# Patient Record
Sex: FEMALE | Race: WHITE | NOT HISPANIC OR LATINO | Employment: STUDENT | URBAN - METROPOLITAN AREA
[De-identification: names, ages, dates, MRNs, and addresses within clinical notes are randomized per-mention and may not be internally consistent; named-entity substitution may affect disease eponyms.]

---

## 2017-03-10 ENCOUNTER — AMBULATORY - HEALTHEAST (OUTPATIENT)
Dept: LAB | Facility: CLINIC | Age: 22
End: 2017-03-10

## 2017-03-10 ENCOUNTER — COMMUNICATION - HEALTHEAST (OUTPATIENT)
Dept: TELEHEALTH | Facility: CLINIC | Age: 22
End: 2017-03-10

## 2017-03-10 ENCOUNTER — COMMUNICATION - HEALTHEAST (OUTPATIENT)
Dept: PEDIATRICS | Facility: CLINIC | Age: 22
End: 2017-03-10

## 2017-03-10 DIAGNOSIS — M34.9 SYSTEMIC SCLEROSIS (H): ICD-10-CM

## 2017-03-10 LAB
ABSOLUTE LYMPHOCYTES (EXTERNAL): 2.3 (ref 0.8–4.4)
ABSOLUTE LYMPHOCYTES (EXTERNAL): 2.3 (ref 0.8–4.4)
ABSOLUTE NEUTROPHILS (EXTERNAL): 2.6 (ref 2–7.7)
ABSOLUTE NEUTROPHILS (EXTERNAL): 2.6 (ref 2–7.7)
ALBUMIN (EXTERNAL): 3.6 (ref 3.5–5)
ALBUMIN (EXTERNAL): 3.6 (ref 3.5–5)
ALT SERPL-CCNC: 13 U/L (ref 0–45)
ALT SERPL-CCNC: 13 U/L (ref 0–45)
AST SERPL-CCNC: 18 U/L (ref 0–40)
AST SERPL-CCNC: 18 U/L (ref 0–40)
BILIRUB SERPL-MCNC: 0.2 MG/DL (ref 0–1)
BILIRUB SERPL-MCNC: 0.2 MG/DL (ref 0–1)
CREAT SERPL-MCNC: 0.63 MG/DL (ref 0.6–1.1)
CREATININE (EXTERNAL): 0.63 (ref 0.5–1.1)
CREATININE (EXTERNAL): 0.63 (ref 0.6–1.1)
CRP INFLAMMATION (EXTERNAL): 1 (ref 0–0.8)
CRP INFLAMMATION (EXTERNAL): 1 (ref 0–0.8)
ERYTHROCYTE [SEDIMENTATION RATE] IN BLOOD: 5 MM/HR (ref 0–20)
ERYTHROCYTE [SEDIMENTATION RATE] IN BLOOD: 5 MM/HR (ref 0–20)
GFR SERPL CREATININE-BSD FRML MDRD: >60 ML/MIN/1.73M2
HEMOGLOBIN: 12.8 G/DL (ref 12–15)
HEMOGLOBIN: 12.8 G/DL (ref 12–16)
PLATELET # BLD AUTO: 518 10^9/L (ref 140–440)
PLATELET # BLD AUTO: 518 10^9/L (ref 140–440)
WBC # BLD AUTO: 5.6 10^9/L (ref 4–11)
WBC # BLD AUTO: 5.6 10^9/L (ref 4–11)

## 2017-06-26 ENCOUNTER — OFFICE VISIT (OUTPATIENT)
Dept: DERMATOLOGY | Facility: CLINIC | Age: 22
End: 2017-06-26
Attending: DERMATOLOGY
Payer: COMMERCIAL

## 2017-06-26 ENCOUNTER — OFFICE VISIT (OUTPATIENT)
Dept: RHEUMATOLOGY | Facility: CLINIC | Age: 22
End: 2017-06-26
Attending: PEDIATRICS
Payer: COMMERCIAL

## 2017-06-26 ENCOUNTER — RECORDS - HEALTHEAST (OUTPATIENT)
Dept: ADMINISTRATIVE | Facility: OTHER | Age: 22
End: 2017-06-26

## 2017-06-26 VITALS
WEIGHT: 139.11 LBS | TEMPERATURE: 98 F | DIASTOLIC BLOOD PRESSURE: 67 MMHG | SYSTOLIC BLOOD PRESSURE: 101 MMHG | BODY MASS INDEX: 24.65 KG/M2 | HEART RATE: 95 BPM | HEIGHT: 63 IN

## 2017-06-26 VITALS
SYSTOLIC BLOOD PRESSURE: 101 MMHG | HEART RATE: 95 BPM | HEIGHT: 63 IN | DIASTOLIC BLOOD PRESSURE: 67 MMHG | WEIGHT: 139.11 LBS | TEMPERATURE: 98 F | BODY MASS INDEX: 24.65 KG/M2

## 2017-06-26 DIAGNOSIS — L94.0 MORPHEA: Primary | ICD-10-CM

## 2017-06-26 DIAGNOSIS — L94.1 LINEAR MORPHEA: Primary | ICD-10-CM

## 2017-06-26 DIAGNOSIS — Z79.60 LONG-TERM USE OF IMMUNOSUPPRESSANT MEDICATION: ICD-10-CM

## 2017-06-26 PROCEDURE — 99211 OFF/OP EST MAY X REQ PHY/QHP: CPT | Mod: ZF

## 2017-06-26 PROCEDURE — 99213 OFFICE O/P EST LOW 20 MIN: CPT | Mod: ZF

## 2017-06-26 ASSESSMENT — PAIN SCALES - GENERAL
PAINLEVEL: NO PAIN (0)
PAINLEVEL: NO PAIN (0)

## 2017-06-26 NOTE — PROGRESS NOTES
Problem list:     Patient Active Problem List    Diagnosis Date Noted     Long-term use of immunosuppressant medication 06/26/2017     Morphea 03/04/2015     Attention deficit hyperactivity disorder (ADHD), predominantly hyperactive type 11/08/2005          Allergies:     No Known Allergies          Medications:     As of completion of this visit:  Current Outpatient Prescriptions   Medication Sig Dispense Refill     methotrexate 2.5 MG tablet CHEMO Take 25 mg orally weekly 130 tablet 3     folic acid (FOLVITE) 1 MG tablet Take 1 tablet (1 mg) by mouth daily 90 tablet 3     mycophenolate (CELLCEPT - GENERIC EQUIVALENT) 500 MG tablet Take 2 tablets (1,000 mg) by mouth 2 times daily 360 tablet 6     tacrolimus (PROTOPIC) 0.03 % ointment Apply topically 2 times daily Apply bid to face and neck as directed  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         Subjective:     rBionna is a 21 year old female who was seen in Pediatric Rheumatology clinic today for follow up.  Brionna was last seen in our clinic on 12/19/2016 and returns today accompanied by her mother.  The primary encounter diagnosis was Morphea. A diagnosis of Long-term use of immunosuppressant medication was also pertinent to this visit.      Brionna has done well.  She is not aware of any substantial progression of her linear scleroderma (morphea).  She wonders if the area of her right neck just below the ear and anterior to the mandible might be somewhat thinner.  She now notices 2 gray hairs on her scalp.  She has not seen loss of hair from the scalp that  "is progressive .  There have been no new spots of involvement on her body.  She has been tolerating her medications well.  She had a cough for about 2 months at school.  Since coming home, the cough has not been an issue, but she has developed some nasal congestion.  Comprehensive Review of Systems is otherwise negative.    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  Move in plans for the fall are coming along (see last time) with painting accomplished this past month.          Examination:     Blood pressure 101/67, pulse 95, temperature 98  F (36.7  C), temperature source Oral, height 5' 2.87\" (159.7 cm), weight 139 lb 1.8 oz (63.1 kg).  Brionna appears generally well and in good spirits.  Head: Normal head and hair.  Eyes: No scleral injection, pupils normal.  Ears: Normal external structures, tympanic membranes.  Nose: No cartilage deformity, congestion.  Mouth: Normal teeth, gums, tongue, mucosa.  Throat: Normal, without erythema or exudate.  Neck: Normal, without thyromegaly  Nodes: No cervical, supraclavicular, axillary, inguinal adenopathy.  Lungs: Normal effort, clear to ausculation.  Heart: Regular rate and rhythm, S1 and S2, no murmurs; normal peripheral pulses and perfusion.  Abdomen: Soft, non-tender, without hepatomegaly, splenomegaly, or masses.  Skin: Forehead change as noted before.  Hairline intact.  No new hair growth evident to me.  Anterior neck, predominantly on the right, shows stable loss of SC tissue, but veins are not substantially different compared to right.  No change in hairline inferior/posterior to ear to suggest backwards extension.  Nails: No pitting, infection.  Neurological: Alert, appropriately interactive, normal cranial nerves, no deficits, normal gait.  Musculoskeletal: No evidence of current synovitis         Last Lab Results:     Abstract on 05/05/2017   Component Date Value     WBC 03/10/2017 5.6      Hemoglobin 03/10/2017 12.8      Platelet Count 03/10/2017 518*     Absolute " Neutrophils (Ex* 03/10/2017 2.6      Absolute Lymphocytes (Ex* 03/10/2017 2.3      Bilirubin Total (Externa* 03/10/2017 0.2      Albumin (External) 03/10/2017 3.6      AST (External) 03/10/2017 18      ALT (External) 03/10/2017 13      Creatinine (External) 03/10/2017 0.63      CRP Inflammation (Extern* 03/10/2017 1.0*     Sed Rate 03/10/2017 5             Assessment:     Morphea managed with topical tacrolimus, oral CellCept and oral methotrexate, seemingly relatively stable.  She is tolerating her medication well.  I suspect her nasal congestion is allergy related, although there is always the possibility of a sinus infection.  She recently had imaging but the results of this are not yet known.          Plan:     1. She will followup today with Dr. Phelps in Dermatology.    2. I recommend she stay on her current therapies unless directed otherwise by Dr. Phelps.  [Discussed with Dr. Phelps and no changes will be made today, but will be discussed this winter.]  3. Labs should be done every 4 months and since they are not sure what they did with the last order set I created another 1 year order set for them that they should file with their lab as well as keep a copy for future reference.  The orders are as listed below.  These should be done every 3-4 months.    4. I suggested follow up with me in 4-6 months in combination with a visit with Dr. Phelps.  Orders Placed This Encounter   Procedures     CBC with platelets differential     Creatinine     Hepatic panel     RBC  Sed Rate     CRP inflammation     IgG      If there are any new questions or concerns, I would be glad to help and can be reached through our main office at [phone] or our paging  at [phone].    Seamus Shaw MD        CC  Patient Care Team:  Aleah Moreno MD as PCP - General (Pediatrics)  Seamus Shaw MD as Referring Physician (Pediatrics)  Remigio Phelps MD as MD (Dermatology)  Schwab, Briana,  RN as Nurse Coordinator  ARSLAN BLOUNT    Copy to patient  YUE HARDING KEVIN  [address] [place] [address]

## 2017-06-26 NOTE — PROGRESS NOTES
[facility] Dermatology Note      Dermatology Problem List:  1. 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 Prominent facial and neck veins    Encounter Date: Jun 26, 2017    CC:   Chief Complaint   Patient presents with     Follow Up For     Morphea       History of Present Illness:  Ms. Brionna Griffin is a 21 year old female who presents as a follow-up for morphea. The patient was last seen 12/19/2016 when her MMF was maintained at 1000mg BID and MTX 25mg weekly (takes 12.5mg Friday and Saturday). We also recommended restarting her protopic ointment BID prn to the morphea plaque on neck which Brionna feels she has not needed. She denies any new skin lesions or concerns today. She is applying sunscreen regularly. She currently has a cough, no recent fevers or other illnesses.    Past Medical History:   Patient Active Problem List   Diagnosis     Morphea     Long-term use of immunosuppressant medication     Past Medical History:   Diagnosis Date     Long-term use of immunosuppressant medication 6/26/2017     History reviewed. No pertinent surgical history.    Medications:  Current Outpatient Prescriptions   Medication Sig Dispense Refill     methotrexate 2.5 MG tablet CHEMO Take 25 mg orally weekly 130 tablet 3     folic acid (FOLVITE) 1 MG tablet Take 1 tablet (1 mg) by mouth daily 90 tablet 3     mycophenolate (CELLCEPT - GENERIC EQUIVALENT) 500 MG tablet Take 2 tablets (1,000 mg) by mouth 2 times daily 360 tablet 6     tacrolimus (PROTOPIC) 0.03 % ointment Apply topically 2 times daily Apply bid to face and neck as directed  Patient has linear scleroderma and has had numerous topical steroids in the past 60 g 1     amphetamine-dextroamphetamine (ADDERALL XR) 30 MG per capsule Take by mouth daily       desogestrel-ethinyl estradiol (APRI) 0.15-30 MG-MCG per tablet Take 1 tablet by mouth daily       Ascorbic Acid  "(VITAMIN C PO) Take 1,000 mg by mouth          No Known Allergies      Review of Systems:  -As per HPI  -Constitutional: The patient denies fatigue, fevers, chills, unintended weight loss, and night sweats.  -HEENT: Patient denies nonhealing oral sores.  -Skin: As above in HPI. No additional skin concerns.    Physical exam:  Vitals: /67  Pulse 95  Temp 98  F (36.7  C) (Oral)  Ht 5' 2.87\" (159.7 cm)  Wt 139 lb 1.8 oz (63.1 kg)  BMI 24.74 kg/m2  GEN: This is a well developed, well-nourished female in no acute distress, in a pleasant mood.    SKIN: Focused examination of the face, neck, scalp, right and left hands, and finger nails was performed.  -Linear atrophic plaque of the medial forehead with sparing of the glabella extending to the anterior medial scalp: From central scalp to tip of nose it [id number], widest at top (2cm in width).    -Mildly atrophic hyperpigmented plaque 10x8cm on the right lateral neck with prominent underlying veins  -No other lesions of concern on areas examined.                     Impression/Plan:  1. Morphea with en waqar fernandes: stable since her last visit   -Continue MMF 1000mg BID and MTX 25mg Qweek, per rheumatology. If pt is stable at the next visit, may consider discontinuation of mycophenolate per Dr. Shaw.   -Encouraged pt to continue to follow sun protective measures to minimize the appearence of the morphea on the neck  -Encouraged follow-up with her orthodontist for a repeat panoramic XR. We discussed with family that there have been case reports of patients with linea morphea also with affected shortened teeth.     CC Dr. Seamus Shaw on close of this encounter.    Follow-up in 6 months, earlier for new or changing lesions.     Dr. Phelps staffed the patient.    Isabel Martin M.D.   PGY-2 Pediatric Resident  [phone]    I have personally examined this patient and agree with the resident's documentation and plan of care.  I have reviewed and amended the " resident's note above.  The documentation accurately reflects my clinical observations, diagnoses, treatment and follow-up plans.     Remigio Phelps MD  , Pediatric Dermatology

## 2017-06-26 NOTE — MR AVS SNAPSHOT
After Visit Summary   6/26/2017    Brionna Griffin    MRN: [id number]           Patient Information     Date Of Birth          1995        Visit Information        Provider Department      6/26/2017 11:00 AM Remigio Phelps MD Peds Dermatology        [facility]- Pediatric Dermatology  Dr. Michelle Sullivan, Dr. Dena Bishop, Dr. Remigio Phelps, Dr. Filomena Gardner, Dr. Derick Wright       Pediatric Appointment Scheduling and Call Center [phone]     Non Urgent -Triage Voicemail Line; [phone]- Regina and Nora RN's. Messages are checked periodically throughout the day and are returned as soon as possible.      Clinic Fax number: [phone]    If you need a prescription refill, please contact your pharmacy. They will send us an electronic request. Refills are approved or denied by our Physicians during normal business hours, Monday through Fridays    Per office policy, refills will not be granted if you have not been seen within the past year (or sooner depending on your child's condition)    *Radiology Scheduling- [phone]  *Sedation Unit Scheduling- [phone]  *[place] Scheduling- General [phone]; Pediatric Dermatology [phone]  *Main  Services: [phone]   Palestinian: [phone]   French: [phone]   Hmong/Bengali/Sinhala: [phone]    For urgent matters that cannot wait until the next business day, is over a holiday and/or a weekend please call [phone] and ask for the Dermatology Resident On-Call to be paged.               Today:   - We recommend you follow-up with your orthodontist to obtain another x-ray  - Continue your current medications - MMF 1000mg twice daily and Methotrexate 25mg daily  - Follow up in derm clinic in 6 months at which time we will discuss coming off of a medication             Follow-ups after your visit        Follow-up notes from your care team      Return in about 6 months (around 2017).      Future tests that were ordered for you today     Open Standing Orders        Priority Remaining Interval Expires Ordered    CBC with platelets differential Routine 4/4 Every 3-4 months 2018    Creatinine Routine 4/4 Every 3-4 months 2018    Hepatic panel Routine 4/4 Every 3-4 months 2018    RBC  Sed Rate Routine 4/4 Every 3-4 months 2018    CRP inflammation Routine 4/4 Every 3-4 months 2018    IgG Routine 4/4 Every 3-4 months 2018            Who to contact     Please call your clinic at [phone] to:    Ask questions about your health    Make or cancel appointments    Discuss your medicines    Learn about your test results    Speak to your doctor   If you have compliments or concerns about an experience at your clinic, or if you wish to file a complaint, please contact [facility] Patient Relations at [phone] or email us at [email or web address]         Additional Information About Your Visit        [employer] Information     [employer] is an electronic gateway that provides easy, online access to your medical records. With [facility], you can request a clinic appointment, read your test results, renew a prescription or communicate with your care team.     To sign up for [employer] visit the website at [email or web address]   You will be asked to enter the access code listed below, as well as some personal information. Please follow the directions to create your username and password.     Your access code is: [email or web address]  Expires: 2017 11:58 AM     Your access code will  in 90 days. If you need help or a new code, please contact your [facility] or call [phone] for assistance.        Care EveryWhere ID     This is your Care EveryWhere ID. This could be used by other organizations to access your  "[facility] medical records  [id number]        Your Vitals Were     Pulse Temperature Height BMI (Body Mass Index)          95 98  F (36.7  C) (Oral) 5' 2.87\" (159.7 cm) 24.74 kg/m2         Blood Pressure from Last 3 Encounters:   06/26/17 101/67   06/26/17 101/67   12/19/16 114/76    Weight from Last 3 Encounters:   06/26/17 139 lb 1.8 oz (63.1 kg) (69 %)*   06/26/17 139 lb 1.8 oz (63.1 kg) (69 %)*   12/19/16 141 lb 15.6 oz (64.4 kg) (74 %)*     * Growth percentiles are based on CDC 2-20 Years data.              Today, you had the following     No orders found for display       Primary Care Provider Office Phone # Fax #    Aleaheda Moreno [phone]       [facility] [address] [address]        Equal Access to Services     [facility]: Hadii maritza ku hadasho Soomaali, waaxda luqadaha, qaybta kaalmada adeegyada, waxay theresain denys russell . So [facility] [phone].    ATENCIÓN: Si habla español, tiene a redmond disposición servicios gratuitos de asistencia lingüística. [facility] [phone].    We comply with applicable federal civil rights laws and [place] laws. We do not discriminate on the basis of race, color, national origin, age, disability sex, sexual orientation or gender identity.            Thank you!     Thank you for choosing PEDS DERMATOLOGY  for your care. Our goal is always to provide you with excellent care. Hearing back from our patients is one way we can continue to improve our services. Please take a few minutes to complete the written survey that you may receive in the mail after your visit with us. Thank you!             Your Updated Medication List - Protect others around you: Learn how to safely use, store and throw away your medicines at www.disposemymeds.org.          This list is accurate as of: 6/26/17 11:58 AM.  Always use your most recent med list.                   Brand Name Dispense Instructions for use Diagnosis    " amphetamine-dextroamphetamine 30 MG per 24 hr capsule    ADDERALL XR     Take by mouth daily        desogestrel-ethinyl estradiol 0.15-30 MG-MCG per tablet    APRI     Take 1 tablet by mouth daily        folic acid 1 MG tablet    FOLVITE    90 tablet    Take 1 tablet (1 mg) by mouth daily    Morphea       methotrexate 2.5 MG tablet CHEMO     130 tablet    Take 25 mg orally weekly        mycophenolate 500 MG tablet    CELLCEPT - GENERIC EQUIVALENT    360 tablet    Take 2 tablets (1,000 mg) by mouth 2 times daily    Morphea       tacrolimus 0.03 % ointment    PROTOPIC    60 g    Apply topically 2 times daily Apply bid to face and neck as directed 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

## 2017-06-26 NOTE — LETTER
6/26/2017      RE: Brionna Griffin  [address]   [place] [place] [address]       [facility] Dermatology Note      Dermatology Problem List:  1. Morphea with en coup de sabre on long term methotrexate and cellept therapy  -MMF 1000mg BID and MTX 25mg twice per week    2. Prominent facial and neck veins    Encounter Date: Jun 26, 2017    CC:   Chief Complaint   Patient presents with     Follow Up For     Morphea       History of Present Illness:  Ms. Brionna Griffin is a 21 year old female who presents as a follow-up for morphea. The patient was last seen 12/19/2016 when her MMF was maintained at 1000mg BID and MTX 25mg weekly (takes 12.5mg Friday and Saturday). We also recommended restarting her protopic ointment BID prn to the morphea plaque on neck which Brionna feels she has not needed. She denies any new skin lesions or concerns today. She is applying sunscreen regularly. She currently has a cough, no recent fevers or other illnesses.    Past Medical History:   Patient Active Problem List   Diagnosis     Morphea     Long-term use of immunosuppressant medication     Past Medical History:   Diagnosis Date     Long-term use of immunosuppressant medication 6/26/2017     History reviewed. No pertinent surgical history.    Medications:  Current Outpatient Prescriptions   Medication Sig Dispense Refill     methotrexate 2.5 MG tablet CHEMO Take 25 mg orally weekly 130 tablet 3     folic acid (FOLVITE) 1 MG tablet Take 1 tablet (1 mg) by mouth daily 90 tablet 3     mycophenolate (CELLCEPT - GENERIC EQUIVALENT) 500 MG tablet Take 2 tablets (1,000 mg) by mouth 2 times daily 360 tablet 6     tacrolimus (PROTOPIC) 0.03 % ointment Apply topically 2 times daily Apply bid to face and neck as directed  Patient has linear scleroderma and has had numerous topical steroids in the past 60 g 1     amphetamine-dextroamphetamine (ADDERALL XR) 30 MG per capsule Take by mouth daily       desogestrel-ethinyl  "estradiol (APRI) 0.15-30 MG-MCG per tablet Take 1 tablet by mouth daily       Ascorbic Acid (VITAMIN C PO) Take 1,000 mg by mouth          No Known Allergies      Review of Systems:  -As per HPI  -Constitutional: The patient denies fatigue, fevers, chills, unintended weight loss, and night sweats.  -HEENT: Patient denies nonhealing oral sores.  -Skin: As above in HPI. No additional skin concerns.    Physical exam:  Vitals: /67  Pulse 95  Temp 98  F (36.7  C) (Oral)  Ht 5' 2.87\" (159.7 cm)  Wt 139 lb 1.8 oz (63.1 kg)  BMI 24.74 kg/m2  GEN: This is a well developed, well-nourished female in no acute distress, in a pleasant mood.    SKIN: Focused examination of the face, neck, scalp, right and left hands, and finger nails was performed.  -Linear atrophic plaque of the medial forehead with sparing of the glabella extending to the anterior medial scalp: From central scalp to tip of nose it [id number], widest at top (2cm in width).    -Mildly atrophic hyperpigmented plaque 10x8cm on the right lateral neck with prominent underlying veins  -No other lesions of concern on areas examined.                     Impression/Plan:  1. Morphea with en waqar maldonado sabyuko: stable since her last visit   -Continue MMF 1000mg BID and MTX 25mg Qweek, per rheumatology. If pt is stable at the next visit, may consider discontinuation of mycophenolate per Dr. Shaw.   -Encouraged pt to continue to follow sun protective measures to minimize the appearence of the morphea on the neck  -Encouraged follow-up with her orthodontist for a repeat panoramic XR. We discussed with family that there have been case reports of patients with linea morphea also with affected shortened teeth.     CC Dr. Seamus Shaw on close of this encounter.    Follow-up in 6 months, earlier for new or changing lesions.     Dr. Phelps staffed the patient.    Isabel Martin M.D.   PGY-2 Pediatric Resident  [phone]    I have personally examined this patient and " agree with the resident's documentation and plan of care.  I have reviewed and amended the resident's note above.  The documentation accurately reflects my clinical observations, diagnoses, treatment and follow-up plans.     Remigio Phelps MD  , Pediatric Dermatology

## 2017-06-26 NOTE — NURSING NOTE
"Chief Complaint   Patient presents with     Follow Up For     Morphea       Initial /67  Pulse 95  Temp 98  F (36.7  C) (Oral)  Ht 5' 2.87\" (159.7 cm)  Wt 139 lb 1.8 oz (63.1 kg)  BMI 24.74 kg/m2 Estimated body mass index is 24.74 kg/(m^2) as calculated from the following:    Height as of this encounter: 5' 2.87\" (159.7 cm).    Weight as of this encounter: 139 lb 1.8 oz (63.1 kg).  Medication Reconciliation: complete     Mary Kate Gupta, MARK    "

## 2017-06-26 NOTE — NURSING NOTE
"Chief Complaint   Patient presents with     Follow Up For     Morphea       Initial /67  Pulse 95  Temp 98  F (36.7  C) (Oral)  Ht 5' 2.87\" (159.7 cm)  Wt 139 lb 1.8 oz (63.1 kg)  BMI 24.74 kg/m2 Estimated body mass index is 24.74 kg/(m^2) as calculated from the following:    Height as of this encounter: 5' 2.87\" (159.7 cm).    Weight as of this encounter: 139 lb 1.8 oz (63.1 kg).  Medication Reconciliation: complete    PT. DECLINED LMX    Mary Kate Gupta CMA    "

## 2017-06-26 NOTE — MR AVS SNAPSHOT
After Visit Summary   6/26/2017    Brionna Griffin    MRN: [id number]           Patient Information     Date Of Birth          1995        Visit Information        Provider Department      6/26/2017 10:00 AM Seamus Shaw MD Peds Rheumatology        Today's Diagnoses     Morphea    -  1    Long-term use of immunosuppressant medication          Care Instructions      Get labs by July 10, and continue every 4 months.      Can see you back with Dr. Phelps whenever she prefers.    [facility] Pediatric Rheumatology    For Help:  The Pediatric Call Center at [phone] can help with scheduling of routine follow up visits.  Lupis Flores and Nadira Jeffrey are the Nurse Coordinators for the Division of Pediatric Rheumatology and can be reached directly at [phone]. They can help with questions about your child s rheumatic condition, medications, and test results.   Please try to schedule infusions 3 months in advance.  Please try to give us 72 hours or longer notice if you need to cancel infusions so other patients can benefit from this opening).  Note: Insurance authorization must be obtained before any infusion can be scheduled. If you change health insurance, you must notify our office as soon as possible, so that the infusion can be reauthorized.    For emergencies after hours or on the weekends, please call the page  at [phone] and ask to speak to the physician on-call for Pediatric Rheumatology. Please do not use ObjectVideo for urgent requests.  Main  Services:  [phone]  o Hmong/Yakut/Luther: [phone]  o Sao Tomean: [phone]  o Qatari: [phone]            Follow-ups after your visit        Future tests that were ordered for you today     Open Standing Orders        Priority Remaining Interval Expires Ordered    CBC with platelets differential Routine 4/4 Every 3-4 months 6/26/2018 6/26/2017    Creatinine Routine 4/4 Every 3-4  "months 2018    Hepatic panel Routine 4/4 Every 3-4 months 2018    RBC  Sed Rate Routine 4/4 Every 3-4 months 2018    CRP inflammation Routine 4/4 Every 3-4 months 2018    IgG Routine 4/4 Every 3-4 months 2018            Who to contact     Please call your clinic at [phone] to:    Ask questions about your health    Make or cancel appointments    Discuss your medicines    Learn about your test results    Speak to your doctor   If you have compliments or concerns about an experience at your clinic, or if you wish to file a complaint, please contact [facility] Patient Relations at [phone] or email us at [email or web address]         Additional Information About Your Visit        Naked Wines Information     Naked Wines is an electronic gateway that provides easy, online access to your medical records. With Naked Wines, you can request a clinic appointment, read your test results, renew a prescription or communicate with your care team.     To sign up for Naked Wines visit the website at [email or web address]   You will be asked to enter the access code listed below, as well as some personal information. Please follow the directions to create your username and password.     Your access code is: [email or web address]  Expires: 2017 11:58 AM     Your access code will  in 90 days. If you need help or a new code, please contact your [facility] or call [phone] for assistance.        Care EveryWhere ID     This is your Care EveryWhere ID. This could be used by other organizations to access your [place] medical records  [id number]        Your Vitals Were     Pulse Temperature Height BMI (Body Mass Index)          95 98  F (36.7  C) (Oral) 5' 2.87\" (159.7 cm) 24.74 kg/m2         Blood Pressure from Last 3 Encounters:   17 101/67   17 101/67   16 114/76    Weight " from Last 3 Encounters:   06/26/17 139 lb 1.8 oz (63.1 kg) (69 %)*   06/26/17 139 lb 1.8 oz (63.1 kg) (69 %)*   12/19/16 141 lb 15.6 oz (64.4 kg) (74 %)*     * Growth percentiles are based on [facility] 2-20 Years data.               Primary Care Provider Office Phone # Fax #    Aleah Moreno [phone]       [facility] [address] [facility] [address]        Equal Access to Services     [facility]: Hadii aad ku hadasho Soomaali, waaxda luqadaha, qaybta kaalmada adesarthakyaantonio, facundo russell . So [facility] [phone].    ATENCIÓN: Si habla español, tiene a redmond disposición servicios gratuitos de asistencia lingüística. [facility] [phone].    We comply with applicable federal civil rights laws and [place] laws. We do not discriminate on the basis of race, color, national origin, age, disability sex, sexual orientation or gender identity.            Thank you!     Thank you for choosing [facility] RHEUMATOLOGY  for your care. Our goal is always to provide you with excellent care. Hearing back from our patients is one way we can continue to improve our services. Please take a few minutes to complete the written survey that you may receive in the mail after your visit with us. Thank you!             Your Updated Medication List - Protect others around you: Learn how to safely use, store and throw away your medicines at www.disposemymeds.org.          This list is accurate as of: 6/26/17 11:58 AM.  Always use your most recent med list.                   Brand Name Dispense Instructions for use Diagnosis    amphetamine-dextroamphetamine 30 MG per 24 hr capsule    ADDERALL XR     Take by mouth daily        desogestrel-ethinyl estradiol 0.15-30 MG-MCG per tablet    APRI     Take 1 tablet by mouth daily        folic acid 1 MG tablet    FOLVITE    90 tablet    Take 1 tablet (1 mg) by mouth daily    Morphea       methotrexate 2.5 MG tablet CHEMO     130 tablet    Take 25 mg  orally weekly        mycophenolate 500 MG tablet    CELLCEPT - GENERIC EQUIVALENT    360 tablet    Take 2 tablets (1,000 mg) by mouth 2 times daily    Morphea       tacrolimus 0.03 % ointment    PROTOPIC    60 g    Apply topically 2 times daily Apply bid to face and neck as directed 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

## 2017-06-26 NOTE — LETTER
6/26/2017      RE: Brionna Griffin  [address]   [facility] [address]           Problem list:     Patient Active Problem List    Diagnosis Date Noted     Long-term use of immunosuppressant medication 06/26/2017     Morphea 03/04/2015     Attention deficit hyperactivity disorder (ADHD), predominantly hyperactive type 11/08/2005          Allergies:     No Known Allergies          Medications:     As of completion of this visit:  Current Outpatient Prescriptions   Medication Sig Dispense Refill     methotrexate 2.5 MG tablet CHEMO Take 25 mg orally weekly 130 tablet 3     folic acid (FOLVITE) 1 MG tablet Take 1 tablet (1 mg) by mouth daily 90 tablet 3     mycophenolate (CELLCEPT - GENERIC EQUIVALENT) 500 MG tablet Take 2 tablets (1,000 mg) by mouth 2 times daily 360 tablet 6     tacrolimus (PROTOPIC) 0.03 % ointment Apply topically 2 times daily Apply bid to face and neck as directed  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         Subjective:     Brionna is a 21 year old female who was seen in Pediatric Rheumatology clinic today for follow up.  Brionna was last seen in our clinic on 12/19/2016 and returns today accompanied by her mother.  The primary encounter diagnosis was Morphea. A diagnosis of Long-term use of immunosuppressant medication was also pertinent to this visit.      Brionna has done well.  She is not aware of any substantial progression of her linear scleroderma (morphea).  She wonders if the area of her right neck just below the ear and anterior to the mandible might be somewhat thinner.  She now  "notices 2 gray hairs on her scalp.  She has not seen loss of hair from the scalp that is progressive .  There have been no new spots of involvement on her body.  She has been tolerating her medications well.  She had a cough for about 2 months at school.  Since coming home, the cough has not been an issue, but she has developed some nasal congestion.  Comprehensive Review of Systems is otherwise negative.    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  Move in plans for the fall are coming along (see last time) with painting accomplished this past month.          Examination:     Blood pressure 101/67, pulse 95, temperature 98  F (36.7  C), temperature source Oral, height 5' 2.87\" (159.7 cm), weight 139 lb 1.8 oz (63.1 kg).  Brionna appears generally well and in good spirits.  Head: Normal head and hair.  Eyes: No scleral injection, pupils normal.  Ears: Normal external structures, tympanic membranes.  Nose: No cartilage deformity, congestion.  Mouth: Normal teeth, gums, tongue, mucosa.  Throat: Normal, without erythema or exudate.  Neck: Normal, without thyromegaly  Nodes: No cervical, supraclavicular, axillary, inguinal adenopathy.  Lungs: Normal effort, clear to ausculation.  Heart: Regular rate and rhythm, S1 and S2, no murmurs; normal peripheral pulses and perfusion.  Abdomen: Soft, non-tender, without hepatomegaly, splenomegaly, or masses.  Skin: Forehead change as noted before.  Hairline intact.  No new hair growth evident to me.  Anterior neck, predominantly on the right, shows stable loss of SC tissue, but veins are not substantially different compared to right.  No change in hairline inferior/posterior to ear to suggest backwards extension.  Nails: No pitting, infection.  Neurological: Alert, appropriately interactive, normal cranial nerves, no deficits, normal gait.  Musculoskeletal: No evidence of current synovitis         Last Lab Results:     Abstract on 05/05/2017   Component Date Value     WBC 03/10/2017 " 5.6      Hemoglobin 03/10/2017 12.8      Platelet Count 03/10/2017 518*     Absolute Neutrophils (Ex* 03/10/2017 2.6      Absolute Lymphocytes (Ex* 03/10/2017 2.3      Bilirubin Total (Externa* 03/10/2017 0.2      Albumin (External) 03/10/2017 3.6      AST (External) 03/10/2017 18      ALT (External) 03/10/2017 13      Creatinine (External) 03/10/2017 0.63      CRP Inflammation (Extern* 03/10/2017 1.0*     Sed Rate 03/10/2017 5             Assessment:     Morphea managed with topical tacrolimus, oral CellCept and oral methotrexate, seemingly relatively stable.  She is tolerating her medication well.  I suspect her nasal congestion is allergy related, although there is always the possibility of a sinus infection.  She recently had imaging but the results of this are not yet known.          Plan:     1. She will followup today with Dr. Phelps in Dermatology.    2. I recommend she stay on her current therapies unless directed otherwise by Dr. Phelps.  [Discussed with Dr. Phelps and no changes will be made today, but will be discussed this winter.]  3. Labs should be done every 4 months and since they are not sure what they did with the last order set I created another 1 year order set for them that they should file with their lab as well as keep a copy for future reference.  The orders are as listed below.  These should be done every 3-4 months.    4. I suggested follow up with me in 4-6 months in combination with a visit with Dr. Phelps.  Orders Placed This Encounter   Procedures     CBC with platelets differential     Creatinine     Hepatic panel     RBC  Sed Rate     CRP inflammation     IgG      If there are any new questions or concerns, I would be glad to help and can be reached through our main office at [phone] or our paging  at [phone].    Seamus Shaw MD        CC  Patient Care Team:  Aleah Moreno MD as PCP - General (Pediatrics)  Lenin, Remigio Isbell MD as MD  (Dermatology)  Schwab, Briana, RN as Nurse Coordinator  ARSLAN BLOUNT    Copy to patient  YUE HARDING KEVIN  [address] [place] [address]

## 2017-06-26 NOTE — PATIENT INSTRUCTIONS
Get labs by July 10, and continue every 4 months.      Can see you back with Dr. Phelps whenever she prefers.    [facility] Pediatric Rheumatology    For Help:  The Pediatric Call Center at [phone] can help with scheduling of routine follow up visits.  Lupis Flores and Nadira Jeffrey are the Nurse Coordinators for the Division of Pediatric Rheumatology and can be reached directly at [phone]. They can help with questions about your child s rheumatic condition, medications, and test results.   Please try to schedule infusions 3 months in advance.  Please try to give us 72 hours or longer notice if you need to cancel infusions so other patients can benefit from this opening).  Note: Insurance authorization must be obtained before any infusion can be scheduled. If you change health insurance, you must notify our office as soon as possible, so that the infusion can be reauthorized.    For emergencies after hours or on the weekends, please call the page  at [phone] and ask to speak to the physician on-call for Pediatric Rheumatology. Please do not use [employer] for urgent requests.  Main  Services:  [phone]  o Hmong/Cuban/Yoruba: [phone]  o Welsh: [phone]  o Faroese: [phone]

## 2017-07-26 ENCOUNTER — COMMUNICATION - HEALTHEAST (OUTPATIENT)
Dept: TELEHEALTH | Facility: CLINIC | Age: 22
End: 2017-07-26

## 2017-07-26 ENCOUNTER — AMBULATORY - HEALTHEAST (OUTPATIENT)
Dept: LAB | Facility: CLINIC | Age: 22
End: 2017-07-26

## 2017-07-26 DIAGNOSIS — L94.0 MORPHEA: ICD-10-CM

## 2017-07-26 LAB
CREAT SERPL-MCNC: 0.73 MG/DL (ref 0.6–1.1)
GFR SERPL CREATININE-BSD FRML MDRD: >60 ML/MIN/1.73M2
IGA SERPL-MCNC: 1174 MG/DL (ref 700–1700)

## 2017-08-05 ENCOUNTER — OFFICE VISIT - HEALTHEAST (OUTPATIENT)
Dept: FAMILY MEDICINE | Facility: CLINIC | Age: 22
End: 2017-08-05

## 2017-08-05 DIAGNOSIS — J32.9 SINUSITIS: ICD-10-CM

## 2017-08-05 DIAGNOSIS — H65.92 LEFT OTITIS MEDIA WITH EFFUSION: ICD-10-CM

## 2017-08-22 ENCOUNTER — OFFICE VISIT - HEALTHEAST (OUTPATIENT)
Dept: FAMILY MEDICINE | Facility: CLINIC | Age: 22
End: 2017-08-22

## 2017-08-22 DIAGNOSIS — J02.0 STREP PHARYNGITIS: ICD-10-CM

## 2017-08-22 DIAGNOSIS — R07.0 THROAT PAIN: ICD-10-CM

## 2017-10-04 ENCOUNTER — COMMUNICATION - HEALTHEAST (OUTPATIENT)
Dept: PEDIATRICS | Facility: CLINIC | Age: 22
End: 2017-10-04

## 2017-10-04 DIAGNOSIS — N94.6 DYSMENORRHEA: ICD-10-CM

## 2017-10-13 ENCOUNTER — OFFICE VISIT - HEALTHEAST (OUTPATIENT)
Dept: PEDIATRICS | Facility: CLINIC | Age: 22
End: 2017-10-13

## 2017-10-13 DIAGNOSIS — Z11.3 ROUTINE SCREENING FOR STI (SEXUALLY TRANSMITTED INFECTION): ICD-10-CM

## 2017-10-13 DIAGNOSIS — Z30.9 CONTRACEPTION MANAGEMENT: ICD-10-CM

## 2017-10-13 DIAGNOSIS — L94.0 MORPHEA: ICD-10-CM

## 2017-10-13 DIAGNOSIS — Z00.129 ENCOUNTER FOR ROUTINE CHILD HEALTH EXAMINATION WITHOUT ABNORMAL FINDINGS: ICD-10-CM

## 2017-10-13 LAB
CREAT SERPL-MCNC: 0.68 MG/DL (ref 0.6–1.1)
GFR SERPL CREATININE-BSD FRML MDRD: >60 ML/MIN/1.73M2
IGA SERPL-MCNC: 1161 MG/DL (ref 700–1700)

## 2017-10-13 ASSESSMENT — MIFFLIN-ST. JEOR: SCORE: 1366.88

## 2017-10-16 ENCOUNTER — COMMUNICATION - HEALTHEAST (OUTPATIENT)
Dept: PEDIATRICS | Facility: CLINIC | Age: 22
End: 2017-10-16

## 2017-10-26 ENCOUNTER — OFFICE VISIT - HEALTHEAST (OUTPATIENT)
Dept: MIDWIFE SERVICES | Facility: CLINIC | Age: 22
End: 2017-10-26

## 2017-10-26 DIAGNOSIS — Z01.812 PRE-PROCEDURE LAB EXAM: ICD-10-CM

## 2017-10-26 DIAGNOSIS — Z30.017 NEXPLANON INSERTION: ICD-10-CM

## 2017-10-26 DIAGNOSIS — Z97.5 NEXPLANON IN PLACE: ICD-10-CM

## 2017-10-26 ASSESSMENT — MIFFLIN-ST. JEOR: SCORE: 1365.97

## 2017-12-22 ENCOUNTER — OFFICE VISIT (OUTPATIENT)
Dept: RHEUMATOLOGY | Facility: CLINIC | Age: 22
End: 2017-12-22
Attending: PEDIATRICS
Payer: COMMERCIAL

## 2017-12-22 ENCOUNTER — RECORDS - HEALTHEAST (OUTPATIENT)
Dept: ADMINISTRATIVE | Facility: OTHER | Age: 22
End: 2017-12-22

## 2017-12-22 ENCOUNTER — OFFICE VISIT (OUTPATIENT)
Dept: DERMATOLOGY | Facility: CLINIC | Age: 22
End: 2017-12-22
Attending: DERMATOLOGY
Payer: COMMERCIAL

## 2017-12-22 VITALS
HEIGHT: 63 IN | SYSTOLIC BLOOD PRESSURE: 120 MMHG | DIASTOLIC BLOOD PRESSURE: 68 MMHG | HEART RATE: 92 BPM | TEMPERATURE: 97.6 F | BODY MASS INDEX: 26.45 KG/M2 | WEIGHT: 149.25 LBS

## 2017-12-22 VITALS
DIASTOLIC BLOOD PRESSURE: 68 MMHG | WEIGHT: 149.25 LBS | HEIGHT: 63 IN | HEART RATE: 92 BPM | SYSTOLIC BLOOD PRESSURE: 120 MMHG | BODY MASS INDEX: 26.45 KG/M2

## 2017-12-22 DIAGNOSIS — L94.0 MORPHEA: Primary | ICD-10-CM

## 2017-12-22 DIAGNOSIS — L94.1 LINEAR MORPHEA: Primary | ICD-10-CM

## 2017-12-22 PROCEDURE — 99211 OFF/OP EST MAY X REQ PHY/QHP: CPT | Mod: ZF

## 2017-12-22 PROCEDURE — 99213 OFFICE O/P EST LOW 20 MIN: CPT | Mod: ZF

## 2017-12-22 RX ORDER — FOLIC ACID 1 MG/1
1 TABLET ORAL DAILY
Qty: 90 TABLET | Refills: 3 | Status: SHIPPED | OUTPATIENT
Start: 2017-12-22 | End: 2019-01-14

## 2017-12-22 RX ORDER — MYCOPHENOLATE MOFETIL 500 MG/1
1000 TABLET ORAL 2 TIMES DAILY
Qty: 360 TABLET | Refills: 6 | Status: SHIPPED | OUTPATIENT
Start: 2017-12-22 | End: 2019-01-14

## 2017-12-22 ASSESSMENT — PAIN SCALES - GENERAL: PAINLEVEL: NO PAIN (0)

## 2017-12-22 NOTE — NURSING NOTE
"Chief Complaint   Patient presents with     RECHECK     Follow up Linear morphea        Initial /68  Pulse 92  Ht 5' 3.27\" (160.7 cm)  Wt 149 lb 4 oz (67.7 kg)  BMI 26.22 kg/m2 Estimated body mass index is 26.22 kg/(m^2) as calculated from the following:    Height as of this encounter: 5' 3.27\" (160.7 cm).    Weight as of this encounter: 149 lb 4 oz (67.7 kg).  Medication Reconciliation: complete   Pt was seen by Dr Shaw earlier, I transferred in vitals from earlier appointment.  Savannah Bennett LPN    "

## 2017-12-22 NOTE — PATIENT INSTRUCTIONS
[facility] Pediatric Rheumatology    For Help:  The Pediatric Call Center at [phone] can help with scheduling of routine follow up visits.  Lupis Flores and Nadira Jeffrey are the Nurse Coordinators for the Division of Pediatric Rheumatology and can be reached directly at [phone]. They can help with questions about your child s rheumatic condition, medications, and test results.   Please try to schedule infusions 3 months in advance.  Please try to give us 72 hours or longer notice if you need to cancel infusions so other patients can benefit from this opening).  Note: Insurance authorization must be obtained before any infusion can be scheduled. If you change health insurance, you must notify our office as soon as possible, so that the infusion can be reauthorized.    For emergencies after hours or on the weekends, please call the page  at [phone] and ask to speak to the physician on-call for Pediatric Rheumatology. Please do not use [employer] for urgent requests.  Main  Services:  [phone]  o Hmong/Gerard/Russian: [phone]  o Hong Konger: [phone]  o Sinhala: [phone]

## 2017-12-22 NOTE — NURSING NOTE
"Chief Complaint   Patient presents with     RECHECK     Follow up Morphea        Initial /68 (BP Location: Right arm, Patient Position: Sitting, Cuff Size: Adult Regular)  Pulse 92  Temp 97.6  F (36.4  C) (Oral)  Ht 5' 3.27\" (160.7 cm)  Wt 149 lb 4 oz (67.7 kg)  BMI 26.22 kg/m2 Estimated body mass index is 26.22 kg/(m^2) as calculated from the following:    Height as of this encounter: 5' 3.27\" (160.7 cm).    Weight as of this encounter: 149 lb 4 oz (67.7 kg).  Medication Reconciliation: complete  I spent 8 min with pt going over meds, charting and getting vitals.  Savannah Bennett LPN    "

## 2017-12-22 NOTE — MR AVS SNAPSHOT
After Visit Summary   12/22/2017    Brionna Griffin    MRN: [id number]           Patient Information     Date Of Birth          1995        Visit Information        Provider Department      12/22/2017 10:00 AM Seamus Shaw MD Peds Rheumatology        Today's Diagnoses     Morphea    -  1      Care Instructions        [facility] Pediatric Rheumatology    For Help:  The Pediatric Call Center at [phone] can help with scheduling of routine follow up visits.  Lupis Flores and Nadira Jeffrey are the Nurse Coordinators for the Division of Pediatric Rheumatology and can be reached directly at [phone]. They can help with questions about your child s rheumatic condition, medications, and test results.   Please try to schedule infusions 3 months in advance.  Please try to give us 72 hours or longer notice if you need to cancel infusions so other patients can benefit from this opening).  Note: Insurance authorization must be obtained before any infusion can be scheduled. If you change health insurance, you must notify our office as soon as possible, so that the infusion can be reauthorized.    For emergencies after hours or on the weekends, please call the page  at [phone] and ask to speak to the physician on-call for Pediatric Rheumatology. Please do not use [employer] for urgent requests.  Main  Services:  [phone]  o Hmong/Armenian/Greenlandic: [phone]  o Samoan: [phone]  o Korean: [phone]            Follow-ups after your visit        Your next 10 appointments already scheduled     Jun 22, 2018  8:15 AM CDT   Return Visit with Remigio Phelps MD   Peds Dermatology ([facility])    [facility]  12th Floor  [address] [id number] [phone]            Jun 22, 2018  8:40 AM CDT   Return Visit with Seamus Shaw MD   Peds Rheumatology ([facility])    [facility]  "Bldg  12th Floor  [address] [facility] [id number] [phone]              Who to contact     Please call your clinic at [phone] to:    Ask questions about your health    Make or cancel appointments    Discuss your medicines    Learn about your test results    Speak to your doctor   If you have compliments or concerns about an experience at your clinic, or if you wish to file a complaint, please contact [facility] Patient Relations at [phone] or email us at [email or web address]         Additional Information About Your Visit        SymphonyharRoom 21 Media Information     Goodzer is an electronic gateway that provides easy, online access to your medical records. With Goodzer, you can request a clinic appointment, read your test results, renew a prescription or communicate with your care team.     To sign up for Goodzer visit the website at [email or web address]   You will be asked to enter the access code listed below, as well as some personal information. Please follow the directions to create your username and password.     Your access code is: [email or web address]  Expires: 3/22/2018 11:37 AM     Your access code will  in 90 days. If you need help or a new code, please contact your [facility] or call [phone] for assistance.        Care EveryWhere ID     This is your Care EveryWhere ID. This could be used by other organizations to access your [place] medical records  [id number]        Your Vitals Were     Pulse Temperature Height BMI (Body Mass Index)          92 97.6  F (36.4  C) (Oral) 5' 3.27\" (160.7 cm) 26.22 kg/m2         Blood Pressure from Last 3 Encounters:   17 120/68   17 120/68   17 101/67    Weight from Last 3 Encounters:   17 149 lb 4 oz (67.7 kg)   17 149 lb 4 oz (67.7 kg)   17 139 lb 1.8 oz (63.1 kg) (69 %)*     * Growth percentiles are based on CDC 2-20 Years data.         "      Today, you had the following     No orders found for display         Where to get your medicines      These medications were sent to [facility]/pharmacy #[id number] - [place], [place] - [address] AT [address] [facility] [place]  [address] [place] [address]     Phone:  [phone]     folic acid 1 MG tablet    methotrexate 2.5 MG tablet CHEMO    mycophenolate 500 MG tablet          Primary Care Provider Office Phone # Fax #    Aleah Moreno [phone]       [facility] [address] [facility] [address] [facility] [address]        Equal Access to Services     [facility]: Hadii aad ku hadasho Soomaali, waaxda luqadaha, qaybta kaalmada adeegyada, facundo russell . So [facility] [phone].    ATENCIÓN: Si habla español, tiene a redmond disposición servicios gratuitos de asistencia lingüística. [facility] [phone].    We comply with applicable federal civil rights laws and [place] laws. We do not discriminate on the basis of race, color, national origin, age, disability, sex, sexual orientation, or gender identity.            Thank you!     Thank you for choosing [facility] RHEUMATOLOGY  for your care. Our goal is always to provide you with excellent care. Hearing back from our patients is one way we can continue to improve our services. Please take a few minutes to complete the written survey that you may receive in the mail after your visit with us. Thank you!             Your Updated Medication List - Protect others around you: Learn how to safely use, store and throw away your medicines at www.disposemymeds.org.          This list is accurate as of: 12/22/17 11:37 AM.  Always use your most recent med list.                   Brand Name Dispense Instructions for use Diagnosis    amphetamine-dextroamphetamine 30 MG per 24 hr capsule    ADDERALL XR     Take by mouth daily        etonogestrel 68 MG Impl    IMPLANON/NEXPLANON     To be used as directed        folic acid 1  MG tablet    FOLVITE    90 tablet    Take 1 tablet (1 mg) by mouth daily    Morphea       methotrexate 2.5 MG tablet CHEMO     130 tablet    Take 25 mg orally weekly    Morphea       mycophenolate 500 MG tablet    GENERIC EQUIVALENT    360 tablet    Take 2 tablets (1,000 mg) by mouth 2 times daily    Morphea       tacrolimus 0.03 % ointment    PROTOPIC    60 g    Apply topically 2 times daily Apply bid to face and neck as directed 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

## 2017-12-22 NOTE — LETTER
12/22/2017      RE: Bironna Griffin  [address]   [place] [address]       [facility] Dermatology Note      Dermatology Problem List:  1. 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 Prominent facial and neck veins    Encounter Date: Dec 22, 2017    CC:   Chief Complaint   Patient presents with     RECHECK     Follow up Linear morphea        History of Present Illness:  Ms. Brionna Griffin is a 22 year old female who presents as a follow-up for linear morphea. The patient was last seen 6/26/17 when her MMF was maintained at 1000mg BID and MTX 25mg weekly (takes 12.5mg Friday and Saturday). She is concerned that she is getting increased thinning of the neck plaque and more involvement of her nose than previous. She just saw Dr Shaw of Rheumatology this morning and they discussed alternative treatments but ultimately decided to stick with the current regimen. While she would like to decrease her med she is apprehensive given the activity of the lesions currently. She is applying sunscreen regularly. She has not had any other changes in her health since her last visit aside from a nexplanon placement. She has not had panorex films of the teeth yet but will get them at her next appt next week.     Past Medical History:   Patient Active Problem List   Diagnosis     Morphea     Long-term use of immunosuppressant medication     Attention deficit hyperactivity disorder (ADHD), predominantly hyperactive type     Past Medical History:   Diagnosis Date     Attention deficit hyperactivity disorder (ADHD), predominantly hyperactive type 11/8/2005     Long-term use of immunosuppressant medication 6/26/2017     No past surgical history on file.    Medications:  Current Outpatient Prescriptions   Medication Sig Dispense Refill     etonogestrel (IMPLANON/NEXPLANON) 68 MG IMPL To be used as directed    "    methotrexate 2.5 MG tablet CHEMO Take 25 mg orally weekly 130 tablet 3     folic acid (FOLVITE) 1 MG tablet Take 1 tablet (1 mg) by mouth daily 90 tablet 3     mycophenolate (GENERIC EQUIVALENT) 500 MG tablet Take 2 tablets (1,000 mg) by mouth 2 times daily 360 tablet 6     tacrolimus (PROTOPIC) 0.03 % ointment Apply topically 2 times daily Apply bid to face and neck as directed  Patient has linear scleroderma and has had numerous topical steroids in the past 60 g 1     amphetamine-dextroamphetamine (ADDERALL XR) 30 MG per capsule Take by mouth daily       Ascorbic Acid (VITAMIN C PO) Take 1,000 mg by mouth       [DISCONTINUED] methotrexate 2.5 MG tablet CHEMO Take 25 mg orally weekly 130 tablet 3     [DISCONTINUED] mycophenolate (CELLCEPT - GENERIC EQUIVALENT) 500 MG tablet Take 2 tablets (1,000 mg) by mouth 2 times daily 360 tablet 6        No Known Allergies      Review of Systems:  -As per HPI  -Constitutional: The patient denies fatigue, fevers, chills, unintended weight loss, and night sweats.  -HEENT: Patient denies nonhealing oral sores.  -Skin: As above in HPI. No additional skin concerns.    Physical exam:  Vitals: /68  Pulse 92  Ht 5' 3.27\" (160.7 cm)  Wt 149 lb 4 oz (67.7 kg)  BMI 26.22 kg/m2  GEN: This is a well developed, well-nourished female in no acute distress, in a pleasant mood.    SKIN: Focused examination of the face, neck, scalp, right and left hands, and finger nails was performed.  -Linear atrophic plaque of the medial forehead extending from the anterior medial scalp and now with extension into the glabella   -Mildly atrophic hyperpigmented plaque on the right lateral neck with prominent underlying veins. Significant loss of subQ fat compared to the contralateral side  -No other lesions of concern on areas examined.     Impression/Plan:  1. Morphea with en coup de sabre: Brionna's skin disease appears stable since her last visit. In contrast to what Brionna has been perceiving " about progression on the neck, we find this stable today. However, after review of many of her clinical images over the course of the past two years, we do feel that there has been subtle progression of the morphea along the right side of the nose. The forehead and neck involvement appears stable. It is unusual for morphea to remain active for many years despite treatment, however this has seemed to be the course with Brionna. We discussed our exam with Dr. Shaw today and at this time no changes to her medication regimen are planned at this time, but we will consider reduction of the mycophenolate at the next visit. We will continue as follows, per Rheumatology.    -Continue MMF 1000mg BID and MTX 25mg Qweek, per rheumatology. If pt is stable at the next visit, may consider discontinuation of mycophenolate per Dr. Shaw.   -Encouraged pt to continue to follow sun protective measures to minimize the appearence of the morphea on the neck  -Encouraged follow-up with her orthodontist for a repeat panoramic XR. We discussed with family that there have been case reports of patients with linea morphea also with affected shortened teeth.     CC Dr. Seamus Shaw on close of this encounter.    Follow-up in 6 months, earlier for new or changing lesions.     Staffed with Remigio Phelps MD  Resident (Annmarie Barriga MD) / Staff (as above)                I have personally examined this patient and agree with the resident's documentation and plan of care.  I have reviewed and amended the resident's note above.  The documentation accurately reflects my clinical observations, diagnoses, treatment and follow-up plans.     Remigio Phelps MD  , Pediatric Dermatology

## 2017-12-22 NOTE — PATIENT INSTRUCTIONS
[facility]- Pediatric Dermatology  Dr. Michelle Sullivan, Dr. Dena Bishop, Dr. Remigio Phelps, Dr. Filomena Gardner, Dr. Derick Wright       Pediatric Appointment Scheduling and Call Center [phone]     Non Urgent -Triage Voicemail Line; [phone]- Regina and Nora RN's. Messages are checked periodically throughout the day and are returned as soon as possible.      Clinic Fax number: [phone]    If you need a prescription refill, please contact your pharmacy. They will send us an electronic request. Refills are approved or denied by our Physicians during normal business hours, Monday through Fridays    Per office policy, refills will not be granted if you have not been seen within the past year (or sooner depending on your child's condition)    *Radiology Scheduling- [phone]  *Sedation Unit Scheduling- [phone]  *[place] Scheduling- General [phone]; Pediatric Dermatology [phone]  *Main  Services: [phone]   Australian: [phone]   Bahamian: [phone]   Hmong/South Sudanese/Luther: [phone]    For urgent matters that cannot wait until the next business day, is over a holiday and/or a weekend please call [phone] and ask for the Dermatology Resident On-Call to be paged.

## 2017-12-22 NOTE — PROGRESS NOTES
Problem list:     Patient Active Problem List    Diagnosis Date Noted     Long-term use of immunosuppressant medication 06/26/2017     Morphea 03/04/2015     Attention deficit hyperactivity disorder (ADHD), predominantly hyperactive type 11/08/2005          Allergies:     No Known Allergies          Medications:     As of completion of this visit:  Current Outpatient Prescriptions   Medication Sig Dispense Refill     etonogestrel (IMPLANON/NEXPLANON) 68 MG IMPL To be used as directed       methotrexate 2.5 MG tablet CHEMO Take 25 mg orally weekly 130 tablet 3     folic acid (FOLVITE) 1 MG tablet Take 1 tablet (1 mg) by mouth daily 90 tablet 3     mycophenolate (GENERIC EQUIVALENT) 500 MG tablet Take 2 tablets (1,000 mg) by mouth 2 times daily 360 tablet 6     amphetamine-dextroamphetamine (ADDERALL XR) 30 MG per capsule Take by mouth daily       Ascorbic Acid (VITAMIN C PO) Take 1,000 mg by mouth       tacrolimus (PROTOPIC) 0.03 % ointment Apply topically 2 times daily Apply bid to face and neck as directed  Patient has linear scleroderma and has had numerous topical steroids in the past 60 g 1     [DISCONTINUED] methotrexate 2.5 MG tablet CHEMO Take 25 mg orally weekly 130 tablet 3     [DISCONTINUED] mycophenolate (CELLCEPT - GENERIC EQUIVALENT) 500 MG tablet Take 2 tablets (1,000 mg) by mouth 2 times daily 360 tablet 6      Brionna has been receiving and tolerating her medications well, without missed doses or notable side effects.         Subjective:     Brionna is a 22 year old female who was seen in Pediatric Rheumatology clinic today for follow up.  Brionna was last seen in our clinic on 6/26/2017 and returns today accompanied by her mother.  The encounter diagnosis was Morphea.      This summer and this past semester went well for Brionna.  She gets her methotrexate every week, but says that she is probably only getting 80% of her mycophenolate, because she sometimes misses the second dose or even both doses.  " I asked her mother how she thinks his skin is doing and she feels that there is been further progression involving the neck, with extension to the left side of the neck.  Brionna does not necessarily disagree but said no one else is really noticing anything going on, and she is not symptomatic.  Her mother asked whether it would be better for her to take the methotrexate by shot, but it is clear that she will not take the medicine at all if she has to do it by shot.  I therefore replied that it would be better for her to take it in some form then to aim for the ideal administration with nonadherence as the result.  Comprehensive Review of Systems is otherwise negative.  School has been going well; she is majoring in Education.         Examination:     Blood pressure 120/68, pulse 92, temperature 97.6  F (36.4  C), temperature source Oral, height 5' 3.27\" (160.7 cm), weight 149 lb 4 oz (67.7 kg).    Brionna appears generally well and in good spirits.  Head: Normal head and hair.  Eyes: No scleral injection, pupils normal.  Ears: Normal external structures, tympanic membranes.  Nose: No cartilage deformity, congestion.  Mouth: Normal teeth except loose right upper incisor, gums show recession for the loose tooth, normal tongue, mucosa.  Throat: Normal, without erythema or exudate.  Neck: Normal, without thyromegaly  Nodes: No cervical, supraclavicular, axillary, inguinal adenopathy.  Lungs: Normal effort, clear to ausculation.  Heart: Regular rate and rhythm, S1 and S2, no murmurs; normal peripheral pulses and perfusion.  Abdomen: Soft, non-tender, without hepatomegaly, splenomegaly, or masses.  Skin: The V-shaped area of involvement of the forehead is as noted before, with depression to the right of the midline, extending from the eyebrow up towards the scalp.  She has lost some hair in the past in the scalp.  I can feel a slight indentation on the right side of her nose at the tip but the color change is not obvious " to me.  She has thinning of the skin of the right neck, with mild violaceous discoloration due to the thinness of the skin and the vascularity of the tissue.  There are spotty changes extending to the left of the midline.  Nails: No pitting, infection.  Neurological: Alert, appropriately interactive, normal cranial nerves, no deficits, normal gait.  Musculoskeletal: No evidence of current synovitis of the cervical spine, TMJ, sternoclavicular, acromioclavicular, glenohumeral, elbow, wrist, finger, lumbar spine, sacroiliac, hip, knee, ankle, or toe joints. No tendonitis or bursitis. No enthesitis.            Assessment:     Linear scleroderma (morphea) of the forehead, with progressive changes involving the right neck and now the left neck.  These are associated with some loss of subcutaneous tissue but are not entrapping any underlying tissues and therefore are in the sense significantly milder.  I do not know whether attempts to adjust the methotrexate or CellCept will really be sufficient, and I am not even sure that they are having a clear influence on her course at this time.  We have used anti-TNF therapy and other patients but I have my doubts as to its effectiveness.  I think there is more compelling information regarding Actemra or possibly Orencia.  So far we have not been able to get other patients on either of these meds due to insurance limitations, despite the endorsement by our profession.             Plan:     1. Follow-up with Dr. Phelps today. (We discussed the therapy and imaging plan after [facility]'s visits departure.)  2. Laboratory monitoring every 3-4 months to monitor medications and disease activity. We reviewed the two sets from July and October and they were fine except for the CRP related to an illness in October.  3. No imaging is needed today, but a follow up Panorex is being arranged by Dr. Phelps.  4. Medications unchanged for now, but possible reduction next summer.  5. Follow-up  in 6 months.     If there are any new questions or concerns, I would be glad to help and can be reached through our main office at [phone] or our paging  at [phone].    Seamus Shaw MD      CC  Patient Care Team:  Aleah Moreno MD as PCP - General (Pediatrics)  Seamus Shaw MD as Referring Physician (Pediatrics)  Remigio Phelps MD as MD (Dermatology)  Schwab, Briana, RN as Nurse Coordinator  SELF, REFERRED    Copy to patient  YUE HARDING KEVIN  [address] [place] [address]

## 2017-12-22 NOTE — MR AVS SNAPSHOT
After Visit Summary   12/22/2017    Brionna Griffin    MRN: [id number]           Patient Information     Date Of Birth          1995        Visit Information        Provider Department      12/22/2017 11:00 AM Remigio Phelps MD Peds Dermatology        Care [facility]- Pediatric Dermatology  Dr. Michelle Sullivan, Dr. Dena Bishop, Dr. Remigio Phelps, Dr. Filomena Gardner, Dr. Derick Wright       Pediatric Appointment Scheduling and Call Center [phone]     Non Urgent -Triage Voicemail Line; [phone]- Regina and Nora RN's. Messages are checked periodically throughout the day and are returned as soon as possible.      Clinic Fax number: [phone]    If you need a prescription refill, please contact your pharmacy. They will send us an electronic request. Refills are approved or denied by our Physicians during normal business hours, Monday through Fridays    Per office policy, refills will not be granted if you have not been seen within the past year (or sooner depending on your child's condition)    *Radiology Scheduling- [phone]  *Sedation Unit Scheduling- [phone]  *[place] Scheduling- General [phone]; Pediatric Dermatology [phone]  *Main  Services: [phone]   Taiwanese: [phone]   [place] citizen: [phone]   Hmong/Cymraes/Ecuadorean: [phone]    For urgent matters that cannot wait until the next business day, is over a holiday and/or a weekend please call [phone] and ask for the Dermatology Resident On-Call to be paged.                         Follow-ups after your visit        Follow-up notes from your care team     Return in about 6 months (around 6/22/2018).      Your next 10 appointments already scheduled     Jun 22, 2018  8:15 AM CDT   Return Visit with MD Corinne Martínez Dermatology ([facility])    [facility]  12th Floor  [address] [place] [address]  "[place]  [facility] [id number] [phone]            2018  8:40 AM CDT   Return Visit with Seamus Shaw MD   Peds Rheumatology ([facility])    [facility]  12th Floor  [address] [place] [id number] [phone]              Who to contact     Please call your clinic at [phone] to:    Ask questions about your health    Make or cancel appointments    Discuss your medicines    Learn about your test results    Speak to your doctor   If you have compliments or concerns about an experience at your clinic, or if you wish to file a complaint, please contact [facility] Patient Relations at [phone] or email us at [email or web address]         Additional Information About Your Visit        Fresh [employer] Information     EasyRun is an electronic gateway that provides easy, online access to your medical records. With EasyRun, you can request a clinic appointment, read your test results, renew a prescription or communicate with your care team.     To sign up for EasyRun visit the website at [email or web address]   You will be asked to enter the access code listed below, as well as some personal information. Please follow the directions to create your username and password.     Your access code is: [email or web address]  Expires: 3/22/2018 11:37 AM     Your access code will  in 90 days. If you need help or a new code, please contact your [facility] Clinic or call [phone] for assistance.        Care EveryWhere ID     This is your Care EveryWhere ID. This could be used by other organizations to access your [facility] medical records  [id number]        Your Vitals Were     Pulse Height BMI (Body Mass Index)             92 5' 3.27\" (160.7 cm) 26.22 kg/m2          Blood Pressure from Last 3 Encounters:   17 120/68   17 120/68   17 101/67    Weight from Last 3 Encounters:   17 149 lb 4 oz " (67.7 kg)   12/22/17 149 lb 4 oz (67.7 kg)   06/26/17 139 lb 1.8 oz (63.1 kg) (69 %)*     * Growth percentiles are based on [facility][note 3] 2-20 Years data.              Today, you had the following     No orders found for display         Where to get your medicines      These medications were sent to [facility]/pharmacy #[id number] - [place], [place] - [address] AT [facility]. [facility] & [address]  [address] [place] [address]     Phone:  [phone]     folic acid 1 MG tablet    methotrexate 2.5 MG tablet CHEMO    mycophenolate 500 MG tablet          Primary Care Provider Office Phone # Fax #    Aleah Moreno [phone]       [facility] [address] [facility] [address]        Equal Access to Services     ROLAND [facility] HospitalKENISHA : Hadii maritza kessler hadasho Soomaali, waaxda luqadaha, qaybta kaalmada adeegyada, facundo russell . So [facility] [phone].    ATENCIÓN: Si habla español, tiene a redmond disposición servicios gratuitos de asistencia lingüística. Suzan al [phone].    We comply with applicable federal civil rights laws and [place] laws. We do not discriminate on the basis of race, color, national origin, age, disability, sex, sexual orientation, or gender identity.            Thank you!     [facility] care. Our goal is always to provide you with excellent care. Hearing back from our patients is one way we can continue to improve our services. Please take a few minutes to complete the written survey that you may receive in the mail after your visit with us. Thank you!             Your Updated Medication List - Protect others around you: Learn how to safely use, store and throw away your medicines at www.disposemymeds.org.          This list is accurate as of: 12/22/17 11:38 AM.  Always use your most recent med list.                   Brand Name Dispense Instructions for use Diagnosis    amphetamine-dextroamphetamine 30 MG per  24 hr capsule    ADDERALL XR     Take by mouth daily        etonogestrel 68 MG Impl    IMPLANON/NEXPLANON     To be used as directed        folic acid 1 MG tablet    FOLVITE    90 tablet    Take 1 tablet (1 mg) by mouth daily    Morphea       methotrexate 2.5 MG tablet CHEMO     130 tablet    Take 25 mg orally weekly    Morphea       mycophenolate 500 MG tablet    GENERIC EQUIVALENT    360 tablet    Take 2 tablets (1,000 mg) by mouth 2 times daily    Morphea       tacrolimus 0.03 % ointment    PROTOPIC    60 g    Apply topically 2 times daily Apply bid to face and neck as directed 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

## 2017-12-22 NOTE — PROGRESS NOTES
[facility] Dermatology Note      Dermatology Problem List:  1. 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 Prominent facial and neck veins    Encounter Date: Dec 22, 2017    CC:   Chief Complaint   Patient presents with     RECHECK     Follow up Linear morphea        History of Present Illness:  Ms. Brionna Griffin is a 22 year old female who presents as a follow-up for linear morphea. The patient was last seen 6/26/17 when her MMF was maintained at 1000mg BID and MTX 25mg weekly (takes 12.5mg Friday and Saturday). She is concerned that she is getting increased thinning of the neck plaque and more involvement of her nose than previous. She just saw Dr Shaw of Rheumatology this morning and they discussed alternative treatments but ultimately decided to stick with the current regimen. While she would like to decrease her med she is apprehensive given the activity of the lesions currently. She is applying sunscreen regularly. She has not had any other changes in her health since her last visit aside from a nexplanon placement. She has not had panorex films of the teeth yet but will get them at her next appt next week.     Past Medical History:   Patient Active Problem List   Diagnosis     Morphea     Long-term use of immunosuppressant medication     Attention deficit hyperactivity disorder (ADHD), predominantly hyperactive type     Past Medical History:   Diagnosis Date     Attention deficit hyperactivity disorder (ADHD), predominantly hyperactive type 11/8/2005     Long-term use of immunosuppressant medication 6/26/2017     No past surgical history on file.    Medications:  Current Outpatient Prescriptions   Medication Sig Dispense Refill     etonogestrel (IMPLANON/NEXPLANON) 68 MG IMPL To be used as directed       methotrexate 2.5 MG tablet CHEMO Take 25 mg orally weekly 130 tablet 3     folic  "acid (FOLVITE) 1 MG tablet Take 1 tablet (1 mg) by mouth daily 90 tablet 3     mycophenolate (GENERIC EQUIVALENT) 500 MG tablet Take 2 tablets (1,000 mg) by mouth 2 times daily 360 tablet 6     tacrolimus (PROTOPIC) 0.03 % ointment Apply topically 2 times daily Apply bid to face and neck as directed  Patient has linear scleroderma and has had numerous topical steroids in the past 60 g 1     amphetamine-dextroamphetamine (ADDERALL XR) 30 MG per capsule Take by mouth daily       Ascorbic Acid (VITAMIN C PO) Take 1,000 mg by mouth       [DISCONTINUED] methotrexate 2.5 MG tablet CHEMO Take 25 mg orally weekly 130 tablet 3     [DISCONTINUED] mycophenolate (CELLCEPT - GENERIC EQUIVALENT) 500 MG tablet Take 2 tablets (1,000 mg) by mouth 2 times daily 360 tablet 6        No Known Allergies      Review of Systems:  -As per HPI  -Constitutional: The patient denies fatigue, fevers, chills, unintended weight loss, and night sweats.  -HEENT: Patient denies nonhealing oral sores.  -Skin: As above in HPI. No additional skin concerns.    Physical exam:  Vitals: /68  Pulse 92  Ht 5' 3.27\" (160.7 cm)  Wt 149 lb 4 oz (67.7 kg)  BMI 26.22 kg/m2  GEN: This is a well developed, well-nourished female in no acute distress, in a pleasant mood.    SKIN: Focused examination of the face, neck, scalp, right and left hands, and finger nails was performed.  -Linear atrophic plaque of the medial forehead extending from the anterior medial scalp and now with extension into the glabella   -Mildly atrophic hyperpigmented plaque on the right lateral neck with prominent underlying veins. Significant loss of subQ fat compared to the contralateral side  -No other lesions of concern on areas examined.     Impression/Plan:  1. Morphea with en coup de sabre: Brionna's skin disease appears stable since her last visit. In contrast to what Brionna has been perceiving about progression on the neck, we find this stable today. However, after review of " many of her clinical images over the course of the past two years, we do feel that there has been subtle progression of the morphea along the right side of the nose. The forehead and neck involvement appears stable. It is unusual for morphea to remain active for many years despite treatment, however this has seemed to be the course with Brionna. We discussed our exam with Dr. Shaw today and at this time no changes to her medication regimen are planned at this time, but we will consider reduction of the mycophenolate at the next visit. We will continue as follows, per Rheumatology.    -Continue MMF 1000mg BID and MTX 25mg Qweek, per rheumatology. If pt is stable at the next visit, may consider discontinuation of mycophenolate per Dr. Shaw.   -Encouraged pt to continue to follow sun protective measures to minimize the appearence of the morphea on the neck  -Encouraged follow-up with her orthodontist for a repeat panoramic XR. We discussed with family that there have been case reports of patients with linea morphea also with affected shortened teeth.     CC Dr. Seamus Shaw on close of this encounter.    Follow-up in 6 months, earlier for new or changing lesions.     Staffed with Remigio Phelps MD  Resident (Annmarie Barriga MD) / Staff (as above)                I have personally examined this patient and agree with the resident's documentation and plan of care.  I have reviewed and amended the resident's note above.  The documentation accurately reflects my clinical observations, diagnoses, treatment and follow-up plans.     Remigio Phelps MD  , Pediatric Dermatology

## 2018-03-05 ENCOUNTER — TELEPHONE (OUTPATIENT)
Dept: DERMATOLOGY | Facility: CLINIC | Age: 23
End: 2018-03-05

## 2018-06-22 ENCOUNTER — AMBULATORY - HEALTHEAST (OUTPATIENT)
Dept: LAB | Facility: CLINIC | Age: 23
End: 2018-06-22

## 2018-06-22 DIAGNOSIS — L94.0 MORPHEA: ICD-10-CM

## 2018-06-22 LAB
ALBUMIN SERPL-MCNC: 3.8 G/DL (ref 3.5–5)
ALP SERPL-CCNC: 71 U/L (ref 45–120)
ALT SERPL W P-5'-P-CCNC: 12 U/L (ref 0–45)
AST SERPL W P-5'-P-CCNC: 15 U/L (ref 0–40)
BASOPHILS # BLD AUTO: 0 THOU/UL (ref 0–0.2)
BASOPHILS NFR BLD AUTO: 0 % (ref 0–2)
BILIRUB DIRECT SERPL-MCNC: 0.1 MG/DL
BILIRUB SERPL-MCNC: 0.4 MG/DL (ref 0–1)
C REACTIVE PROTEIN LHE: <0.1 MG/DL (ref 0–0.8)
CREAT SERPL-MCNC: 0.68 MG/DL (ref 0.6–1.1)
EOSINOPHIL # BLD AUTO: 0.2 THOU/UL (ref 0–0.4)
EOSINOPHIL NFR BLD AUTO: 2 % (ref 0–6)
ERYTHROCYTE [DISTWIDTH] IN BLOOD BY AUTOMATED COUNT: 13.2 % (ref 11–14.5)
ERYTHROCYTE [SEDIMENTATION RATE] IN BLOOD BY WESTERGREN METHOD: 2 MM/HR (ref 0–20)
GFR SERPL CREATININE-BSD FRML MDRD: >60 ML/MIN/1.73M2
HCT VFR BLD AUTO: 37.1 % (ref 35–47)
HGB BLD-MCNC: 12.4 G/DL (ref 12–16)
IGA SERPL-MCNC: 954 MG/DL (ref 700–1700)
LYMPHOCYTES # BLD AUTO: 1.8 THOU/UL (ref 0.8–4.4)
LYMPHOCYTES NFR BLD AUTO: 26 % (ref 20–40)
MCH RBC QN AUTO: 29.2 PG (ref 27–34)
MCHC RBC AUTO-ENTMCNC: 33.6 G/DL (ref 32–36)
MCV RBC AUTO: 87 FL (ref 80–100)
MONOCYTES # BLD AUTO: 0.3 THOU/UL (ref 0–0.9)
MONOCYTES NFR BLD AUTO: 4 % (ref 2–10)
NEUTROPHILS # BLD AUTO: 4.8 THOU/UL (ref 2–7.7)
NEUTROPHILS NFR BLD AUTO: 68 % (ref 50–70)
PLATELET # BLD AUTO: 370 THOU/UL (ref 140–440)
PMV BLD AUTO: 7.2 FL (ref 7–10)
PROT SERPL-MCNC: 6.7 G/DL (ref 6–8)
RBC # BLD AUTO: 4.26 MILL/UL (ref 3.8–5.4)
WBC: 7.1 THOU/UL (ref 4–11)

## 2018-06-29 ENCOUNTER — RECORDS - HEALTHEAST (OUTPATIENT)
Dept: ADMINISTRATIVE | Facility: OTHER | Age: 23
End: 2018-06-29

## 2018-06-29 ENCOUNTER — OFFICE VISIT (OUTPATIENT)
Dept: RHEUMATOLOGY | Facility: CLINIC | Age: 23
End: 2018-06-29
Attending: PEDIATRICS
Payer: COMMERCIAL

## 2018-06-29 ENCOUNTER — OFFICE VISIT (OUTPATIENT)
Dept: DERMATOLOGY | Facility: CLINIC | Age: 23
End: 2018-06-29
Attending: DERMATOLOGY
Payer: COMMERCIAL

## 2018-06-29 VITALS
DIASTOLIC BLOOD PRESSURE: 67 MMHG | WEIGHT: 145.94 LBS | BODY MASS INDEX: 25.86 KG/M2 | SYSTOLIC BLOOD PRESSURE: 103 MMHG | TEMPERATURE: 97.6 F | HEART RATE: 81 BPM | HEIGHT: 63 IN

## 2018-06-29 DIAGNOSIS — L94.1 LINEAR MORPHEA: Primary | ICD-10-CM

## 2018-06-29 DIAGNOSIS — L94.0 MORPHEA: Primary | ICD-10-CM

## 2018-06-29 PROCEDURE — G0463 HOSPITAL OUTPT CLINIC VISIT: HCPCS | Mod: ZF

## 2018-06-29 RX ORDER — FOLIC ACID 1 MG/1
1 TABLET ORAL
COMMUNITY
Start: 2017-07-28 | End: 2019-12-20

## 2018-06-29 ASSESSMENT — PAIN SCALES - GENERAL: PAINLEVEL: NO PAIN (0)

## 2018-06-29 NOTE — PROGRESS NOTES
Problem list:     Patient Active Problem List    Diagnosis Date Noted     Long-term use of immunosuppressant medication 06/26/2017     Morphea 03/04/2015     Attention deficit hyperactivity disorder (ADHD), predominantly hyperactive type 11/08/2005          Allergies:     No Known Allergies          Medications:     As of completion of this visit:  Current Outpatient Prescriptions   Medication Sig Dispense Refill     folic acid (FOLVITE) 1 MG tablet Take 1 mg by mouth       amphetamine-dextroamphetamine (ADDERALL XR) 30 MG per capsule Take by mouth daily       Ascorbic Acid (VITAMIN C PO) Take 1,000 mg by mouth       etonogestrel (IMPLANON/NEXPLANON) 68 MG IMPL To be used as directed       folic acid (FOLVITE) 1 MG tablet Take 1 tablet (1 mg) by mouth daily 90 tablet 3     methotrexate 2.5 MG tablet CHEMO Take 25 mg orally weekly 130 tablet 3     mycophenolate (GENERIC EQUIVALENT) 500 MG tablet Take 2 tablets (1,000 mg) by mouth 2 times daily 360 tablet 6     tacrolimus (PROTOPIC) 0.03 % ointment Apply topically 2 times daily Apply bid to face and neck as directed  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         Subjective:     Brionna is a 22 year old female who was seen in Pediatric Rheumatology clinic today for follow up.  Brionna was last seen in our clinic on 12/22/2017 and returns today accompanied by her mother.  The encounter diagnosis was Morphea.      OLLIE does not seem to have a strong opinion as to how her condition is doing, but her mother feels that there is been widening of the forehead lesion, and continued progression of the neck.  There are impression from the dermatology visit today was that her medicines can be simplified but they have a concern that this will exacerbate her disease and lead to additional progression.  She is generally doing well and her review of systems form  "on her intake is completely negative.  She is working 2 jobs this summer.  She says part of the reason she misses some of her medicine is that she gets busy with jobs or with social activities and does not have her pills along with her.  She is not having any particular side effects from the medicines and that is not the reason why she misses medicine.           Examination:     Blood pressure 103/67, pulse 81, temperature 97.6  F (36.4  C), temperature source Oral, height 5' 3.07\" (160.2 cm), weight 145 lb 15.1 oz (66.2 kg).  Brionna appears generally well and in good spirits.  Head: Normal head and hair.  Eyes: No scleral injection, pupils normal.  Ears: Normal external structures, tympanic membranes.  Nose: No cartilage deformity, congestion.  Mouth: Normal teeth, gums, tongue, mucosa.  Throat: Normal, without erythema or exudate.  Neck: Normal, without thyromegaly  Nodes: No cervical, supraclavicular, axillary, inguinal adenopathy.  Lungs: Normal effort, clear to ausculation.  Heart: Regular rate and rhythm, S1 and S2, no murmurs; normal peripheral pulses and perfusion.  Abdomen: Soft, non-tender, without hepatomegaly, splenomegaly, or masses.  Skin: She has a bruise on top of her right foot from any known injury, and it appears to be resolving in the usual way.  The right forehead again has the linear lesion from the scalp lying down to below the eyebrow with possible faint extension onto the nose.  It appears in today's visit, which occurred under different lighting than usual, that the base is wider.  This is not just visual but there is also a sense that the lipoatrophy occurs on a greater with at the bottom of this triangular lesion.  She again has faint discoloration of the right and anterior neck, with relative lipoatrophy, but this is not as obvious as the forehead lesion.  Nails: No pitting, infection.  Neurological: Alert, appropriately interactive, normal cranial nerves, no deficits, normal " gait.  Musculoskeletal: No evidence of current synovitis of the cervical spine, TMJ, sternoclavicular, acromioclavicular, glenohumeral, elbow, wrist, finger, lumbar spine, sacroiliac, hip, knee, ankle, or toe joints. No tendonitis or bursitis. No enthesitis.          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  I spoke with Dr. Phelps after the visit, and we went through the impressions we had from the visit, and the discussions that had occurred in the room with the patient.  We reviewed the photos from previous visits.  I think it is hard to be certain about whether there is a significant difference in the lower end of the forehead lesion, despite the impression I had in the room with the patient and her mother.  However I learned from past experience with them that her mother's impressions with regard to progression have been on the rosie.  It turns out that the plan from the dermatology site as well as rheumatology side was to get her taking her CellCept regularly.  I think if there is any sense of continued progression while on CellCept then I would want to try to get Actemra started.  This product has been effective for systemic sclerosis skin involvement, and seems to be a better option than the other immunosuppressive or biologic medicines that have been used for this orphan diagnosis.            Plan:     1. Laboratory monitoring every 3-4 months to monitor medications and disease activity.  Last set in June was reassuring.  Orders for her next set in October are as listed below.  2. We discussed the possibility of Doppler ultrasound to look at the margins of the lesion and see if there is progression but I have concern, based on her past experiences with imaging, as to whether this will be worthwhile.  It seems that the technique works better for the extremities.  3. She will work on making sure that her pills are with her since she is out and about, so that she can always get her medicine in a reliable fashion.  4. Follow-up in 6 months, but they should call sooner if they want to proceed faster with seeking approval of Actemra.  She will probably need to have testing for tuberculosis prior to all request for this medicine.  This can be done as a blood test with her next set of labs for which she is due in October.     If there are any new questions or concerns, I would be glad to help and can be reached through our main office at [phone] or our paging  at [phone].    Seamus Shaw MD    Orders Placed This Encounter   Procedures     CBC with platelets differential     Creatinine     CRP inflammation     Hepatic panel     RBC  Sed Rate     Hepatitis C antibody     Hepatitis B surface antigen     M Tuberculosis by Quantiferon         CC  Patient Care Team:  Aleah Moreno MD as PCP - General (Pediatrics)  Seamus Shaw MD as Referring Physician (Pediatrics)  Remigio Phelps MD as MD (Dermatology)  Schwab, Briana, RN as Nurse Coordinator  Belgica Webber, RN as Nurse Coordinator  SELF, REFERRED    Copy to patient  YUE HARDING KEVIN  [address] [place] [address]

## 2018-06-29 NOTE — LETTER
6/29/2018      RE: Brionna Griffin  [address]   [place] [facility] [address]       [facility] Dermatology Note      Dermatology Problem List:  1. 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 Prominent facial and neck veins    Encounter Date: Jun 29, 2018    CC:   Chief Complaint   Patient presents with     RECHECK     morphea       History of Present Illness:  Ms. Brionna Griffin is a 22 year old female who presents as a follow-up for linear morphea. The patient was last seen 12/22/17 at which time her MMF was maintained at 1000mg BID and MTX 25mg weekly (takes 12.5mg Friday and Saturday). Since then, the patient and her mother both feel like her morphea has been stable. She acknowledges that when looking at her face and neck every day it is hard to appreciate subtle differences, but her mother who goes several months in between seeing Tad while she is away at school also feels like things have been stable. As such, they are interested in possibly decreasing her therapies. The patient has tolerated her methotrexate and mycophenolate mofetil well without any noticeable side effects. She is otherwise feeling well and has no other skin concerns at this time.     Past Medical History:   Patient Active Problem List   Diagnosis     Morphea     Long-term use of immunosuppressant medication     Attention deficit hyperactivity disorder (ADHD), predominantly hyperactive type     Past Medical History:   Diagnosis Date     Attention deficit hyperactivity disorder (ADHD), predominantly hyperactive type 11/8/2005     Long-term use of immunosuppressant medication 6/26/2017     No past surgical history on file.    Social History: Patient studies at [employer], majors in elementary education.    Medications:  Current Outpatient Prescriptions   Medication Sig Dispense Refill     amphetamine-dextroamphetamine  (ADDERALL XR) 30 MG per capsule Take by mouth daily       Ascorbic Acid (VITAMIN C PO) Take 1,000 mg by mouth       etonogestrel (IMPLANON/NEXPLANON) 68 MG IMPL To be used as directed       folic acid (FOLVITE) 1 MG tablet Take 1 tablet (1 mg) by mouth daily 90 tablet 3     methotrexate 2.5 MG tablet CHEMO Take 25 mg orally weekly 130 tablet 3     mycophenolate (GENERIC EQUIVALENT) 500 MG tablet Take 2 tablets (1,000 mg) by mouth 2 times daily 360 tablet 6     tacrolimus (PROTOPIC) 0.03 % ointment Apply topically 2 times daily Apply bid to face and neck as directed  Patient has linear scleroderma and has had numerous topical steroids in the past 60 g 1        No Known Allergies      Review of Systems:  -As per HPI  -Constitutional: The patient denies fatigue, fevers, chills, unintended weight loss, and night sweats.  -HEENT: Patient denies nonhealing oral sores.  -Skin: As above in HPI. No additional skin concerns.    Physical exam:  Vitals: There were no vitals taken for this visit.  GEN: This is a well developed, well-nourished female in no acute distress, in a pleasant mood.    SKIN: Focused examination of the face, neck, scalp, right and left hands, and finger nails was performed.  -Linear atrophic plaque of the medial forehead extending from the anterior medial scalp and extending to the glabella. Today measures 18.3 cm in the cranial/caudal dimension.  -Mildly atrophic subtly hyperpigmented plaque on the right lateral neck with prominent underlying veins. Loss of subQ fat compared to the contralateral side. Today measures 11 x 8 cm   -No other lesions of concern on areas examined.                 Impression/Plan:  1. Morphea with en coup de sabre: Compared to last visit, the patient's morphea appears to have been stable on our examination. We discussed the possibility of tapering off her mycophenolate mofetil while continuing the methotrexate. However, after discussion with Dr. Shaw, he felt that perhaps there  may be some widening of involvement on the nose or nasal bridge. It is always a challenge in our morphea patients to determine activity and progression as it may be quite subtle and subjective. Brionna revealed to Dr. Shaw that she is only taking/getting in about half of her current MMF dosing. As such, we decided to continue current treatment.   The patient was reminded that we would like her to have a panorex performed to follow on the possibility of dental anomalies associated with linear morphea.     -Continue MMF 1000mg BID and MTX 25mg Qweek, per rheumatology.  -Encouraged pt to continue to follow sun protective measures to minimize the appearence of the morphea on the neck  -Encouraged follow-up with her orthodontist for a repeat panoramic XR. We discussed with family that there have been case reports of patients with linea morphea also with affected shortened teeth.     CC Dr. Seamus Shaw on close of this encounter.    Follow-up in 6 months, earlier for new or changing lesions.     Staffed with Remigio Phelps MD  Resident (Ray Stanley MD) / Staff (as above)    I have personally examined this patient and agree with the resident's documentation and plan of care.  I have reviewed and amended the resident's note above.  The documentation accurately reflects my clinical observations, diagnoses, treatment and follow-up plans.     Remigio Phelps MD  , Pediatric Dermatology

## 2018-06-29 NOTE — MR AVS SNAPSHOT
After Visit Summary   6/29/2018    Brionna Griffin    MRN: [id number]           Patient Information     Date Of Birth          1995        Visit Information        Provider Department      6/29/2018 10:00 AM Seamus Shaw MD Peds Rheumatology        Today's Diagnoses     Morphea    -  1      Care Instructions      [facility] Pediatric Rheumatology    For Help:  The Pediatric Call Center at [phone] can help with scheduling of routine follow up visits.  Lupis Flores and Nadira Jeffrey are the Nurse Coordinators for the Division of Pediatric Rheumatology and can be reached directly at [phone]. They can help with questions about your child s rheumatic condition, medications, and test results.   Please try to schedule infusions 3 months in advance.  Please try to give us 72 hours or longer notice if you need to cancel infusions so other patients can benefit from this opening).  Note: Insurance authorization must be obtained before any infusion can be scheduled. If you change health insurance, you must notify our office as soon as possible, so that the infusion can be reauthorized.    For emergencies after hours or on the weekends, please call the page  at [phone] and ask to speak to the physician on-call for Pediatric Rheumatology. Please do not use [email or web address] for urgent requests.  Main  Services:  [phone]  o Hmong/Yi/Sinhala: [phone]  o Togolese: [phone]  o Mongolian: [phone]            Follow-ups after your visit        Follow-up notes from your care team     Return in about 6 months (around 12/29/2018) for Routine Visit.      Future tests that were ordered for you today     Open Future Orders        Priority Expected Expires Ordered    CBC with platelets differential Routine 10/22/2018 12/31/2018 6/29/2018    Creatinine Routine 10/22/2018 12/31/2018 6/29/2018    CRP inflammation Routine 10/22/2018 12/31/2018 6/29/2018     "Hepatic panel Routine 10/22/2018 2018 2018    RBC  Sed Rate Routine 10/22/2018 2018 2018    Hepatitis C antibody Routine 10/22/2018 2018 2018    Hepatitis B surface antigen Routine 10/22/2018 2018 2018    M Tuberculosis by Quantiferon Routine 10/22/2018 2018 2018            Who to contact     Please call your clinic at [phone] to:    Ask questions about your health    Make or cancel appointments    Discuss your medicines    Learn about your test results    Speak to your doctor            Additional Information About Your Visit        Candescent Eye Holdingshart Information     Drill Map is an electronic gateway that provides easy, online access to your medical records. With Drill Map, you can request a clinic appointment, read your test results, renew a prescription or communicate with your care team.     To sign up for [employer]aa Inter[employer]et visit the website at [email or web address]   You will be asked to enter the access code listed below, as well as some personal information. Please follow the directions to create your username and password.     Your access code is: [email or web address]  Expires: 2018  2:47 PM     Your access code will  in 90 days. If you need help or a new code, please contact your [facility] or call [phone] for assistance.        Care EveryWhere ID     This is your Care EveryWhere ID. This could be used by other organizations to access your [place] medical records  [id number]        Your Vitals Were     Pulse Temperature Height BMI (Body Mass Index)          81 97.6  F (36.4  C) (Oral) 5' 3.07\" (160.2 cm) 25.79 kg/m2         Blood Pressure from Last 3 Encounters:   18 103/67   17 120/68   17 120/68    Weight from Last 3 Encounters:   18 145 lb 15.1 oz (66.2 kg)   17 149 lb 4 oz (67.7 kg)   17 149 lb 4 oz (67.7 kg)               Primary Care Provider Office Phone # Fax #    Aleah V " MD Josh [phone]       [facility] [address]  [facility] [address]        Equal Access to Services     BRIANROLAND DARRELL : Hadii maritza kessler steve Collins, watosinda ashantiqjackeline, qajesikata kaanatoliyda junior, facundo payne lajuliaclarice maribel. So [facility] [phone].    ATENCIÓN: Si habla español, tiene a redmond disposición servicios gratuitos de asistencia lingüística. Llame al [phone].    We comply with applicable federal civil rights laws and [place] laws. We do not discriminate on the basis of race, color, national origin, age, disability, sex, sexual orientation, or gender identity.            Thank you!     [facility]. Our goal is always to provide you with excellent care. Hearing back from our patients is one way we can continue to improve our services. Please take a few minutes to complete the written survey that you may receive in the mail after your visit with us. Thank you!             Your Updated Medication List - Protect others around you: Learn how to safely use, store and throw away your medicines at www.disposemymeds.org.          This list is accurate as of 6/29/18  2:47 PM.  Always use your most recent med list.                   Brand Name Dispense Instructions for use Diagnosis    amphetamine-dextroamphetamine 30 MG per 24 hr capsule    ADDERALL XR     Take by mouth daily        etonogestrel 68 MG Impl    IMPLANON/NEXPLANON     To be used as directed        * folic acid 1 MG tablet    FOLVITE     Take 1 mg by mouth        * folic acid 1 MG tablet    FOLVITE    90 tablet    Take 1 tablet (1 mg) by mouth daily    Morphea       methotrexate 2.5 MG tablet CHEMO     130 tablet    Take 25 mg orally weekly    Morphea       mycophenolate 500 MG tablet    GENERIC EQUIVALENT    360 tablet    Take 2 tablets (1,000 mg) by mouth 2 times daily    Morphea       tacrolimus 0.03 % ointment    PROTOPIC    60 g    Apply topically 2 times  daily Apply bid to face and neck as directed 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 Read the directions carefully, and ask your doctor or other care provider to review them with you.

## 2018-06-29 NOTE — PATIENT INSTRUCTIONS
[facility]- Pediatric Dermatology  Dr. Michelle Sullivan, Dr. Dena Bishop, Dr. Remigio Phelps, Dr. Filomena Gardner, Dr. Derick Wright       Pediatric Appointment Scheduling and Call Center [phone]     Non Urgent -Triage Voicemail Line; [phone]- Regina and Nora RN's. Messages are checked periodically throughout the day and are returned as soon as possible.      Clinic Fax number: [phone]    If you need a prescription refill, please contact your pharmacy. They will send us an electronic request. Refills are approved or denied by our Physicians during normal business hours, Monday through Fridays    Per office policy, refills will not be granted if you have not been seen within the past year (or sooner depending on your child's condition)    *Radiology Scheduling- [phone]  *Sedation Unit Scheduling- [phone]  *[place] Scheduling- General [phone]; Pediatric Dermatology [phone]  *Main  Services: [phone]   Zambian: [phone]   Botswanan: [phone]   Hmong/Luxembourger/Luther: [phone]    For urgent matters that cannot wait until the next business day, is over a holiday and/or a weekend please call [phone] and ask for the Dermatology Resident On-Call to be paged.

## 2018-06-29 NOTE — LETTER
6/29/2018      RE: Brionna Griffin  [address]   [facility] [address]         Problem list:     Patient Active Problem List    Diagnosis Date Noted     Long-term use of immunosuppressant medication 06/26/2017     Morphea 03/04/2015     Attention deficit hyperactivity disorder (ADHD), predominantly hyperactive type 11/08/2005          Allergies:     No Known Allergies          Medications:     As of completion of this visit:  Current Outpatient Prescriptions   Medication Sig Dispense Refill     folic acid (FOLVITE) 1 MG tablet Take 1 mg by mouth       amphetamine-dextroamphetamine (ADDERALL XR) 30 MG per capsule Take by mouth daily       Ascorbic Acid (VITAMIN C PO) Take 1,000 mg by mouth       etonogestrel (IMPLANON/NEXPLANON) 68 MG IMPL To be used as directed       folic acid (FOLVITE) 1 MG tablet Take 1 tablet (1 mg) by mouth daily 90 tablet 3     methotrexate 2.5 MG tablet CHEMO Take 25 mg orally weekly 130 tablet 3     mycophenolate (GENERIC EQUIVALENT) 500 MG tablet Take 2 tablets (1,000 mg) by mouth 2 times daily 360 tablet 6     tacrolimus (PROTOPIC) 0.03 % ointment Apply topically 2 times daily Apply bid to face and neck as directed  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         Subjective:     Brionna is a 22 year old female who was seen in Pediatric Rheumatology clinic today for follow up.  Brionna was last seen in our clinic on 12/22/2017 and returns today accompanied by her mother.  The encounter diagnosis was Morphea.      OLLIE does not seem to have a strong opinion as to how her condition is doing, but her mother feels that there is been widening of the forehead lesion, and continued progression of the neck.  There are impression from the dermatology visit today was that her medicines can be simplified but they have a concern that this will exacerbate her disease and lead to  "additional progression.  She is generally doing well and her review of systems form on her intake is completely negative.  She is working 2 jobs this summer.  She says part of the reason she misses some of her medicine is that she gets busy with jobs or with social activities and does not have her pills along with her.  She is not having any particular side effects from the medicines and that is not the reason why she misses medicine.         Examination:     Blood pressure 103/67, pulse 81, temperature 97.6  F (36.4  C), temperature source Oral, height 5' 3.07\" (160.2 cm), weight 145 lb 15.1 oz (66.2 kg).  Brionna appears generally well and in good spirits.  Head: Normal head and hair.  Eyes: No scleral injection, pupils normal.  Ears: Normal external structures, tympanic membranes.  Nose: No cartilage deformity, congestion.  Mouth: Normal teeth, gums, tongue, mucosa.  Throat: Normal, without erythema or exudate.  Neck: Normal, without thyromegaly  Nodes: No cervical, supraclavicular, axillary, inguinal adenopathy.  Lungs: Normal effort, clear to ausculation.  Heart: Regular rate and rhythm, S1 and S2, no murmurs; normal peripheral pulses and perfusion.  Abdomen: Soft, non-tender, without hepatomegaly, splenomegaly, or masses.  Skin: She has a bruise on top of her right foot from any known injury, and it appears to be resolving in the usual way.  The right forehead again has the linear lesion from the scalp lying down to below the eyebrow with possible faint extension onto the nose.  It appears in today's visit, which occurred under different lighting than usual, that the base is wider.  This is not just visual but there is also a sense that the lipoatrophy occurs on a greater with at the bottom of this triangular lesion.  She again has faint discoloration of the right and anterior neck, with relative lipoatrophy, but this is not as obvious as the forehead lesion.  Nails: No pitting, infection.  Neurological: Alert, " appropriately interactive, normal cranial nerves, no deficits, normal gait.  Musculoskeletal: No evidence of current synovitis of the cervical spine, TMJ, sternoclavicular, acromioclavicular, glenohumeral, elbow, wrist, finger, lumbar spine, sacroiliac, hip, knee, ankle, or toe joints. No tendonitis or bursitis. No enthesitis.          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  I spoke with Dr. Phelps after the visit, and we went through the impressions we had from the visit, and the discussions that had occurred in the room with the patient.  We reviewed the photos from previous visits.  I think it is hard to be certain about whether there is a significant difference in the lower end of the forehead lesion, despite the impression I had in the room with the patient and her mother.  However I learned from past experience with them that her mother's impressions with regard to progression have been on the rosie.  It turns out that the plan from the dermatology site as well as rheumatology side was to get her taking her CellCept regularly.  I think if there is any sense of continued progression while on CellCept then I would want to try to get Actemra started.  This product has been effective for systemic sclerosis skin involvement, and seems to be a better option than the other immunosuppressive or  biologic medicines that have been used for this orphan diagnosis.         Plan:     1. Laboratory monitoring every 3-4 months to monitor medications and disease activity.  Last set in June was reassuring.  Orders for her next set in October are as listed below.  2. We discussed the possibility of Doppler ultrasound to look at the margins of the lesion and see if there is progression but I have concern, based on her past experiences with imaging, as to whether this will be worthwhile.  It seems that the technique works better for the extremities.  3. She will work on making sure that her pills are with her since she is out and about, so that she can always get her medicine in a reliable fashion.  4. Follow-up in 6 months, but they should call sooner if they want to proceed faster with seeking approval of Actemra.  She will probably need to have testing for tuberculosis prior to all request for this medicine.  This can be done as a blood test with her next set of labs for which she is due in October.     If there are any new questions or concerns, I would be glad to help and can be reached through our main office at [phone] or our paging  at [phone].    Seamus Shaw MD    Orders Placed This Encounter   Procedures     CBC with platelets differential     Creatinine     CRP inflammation     Hepatic panel     RBC  Sed Rate     Hepatitis C antibody     Hepatitis B surface antigen     M Tuberculosis by Quantiferon     Seamus Shaw MD    CC  Patient Care Team:  Aleah Moreno MD as PCP - General (Pediatrics)  Remigio Phelps MD as MD (Dermatology)  Schwab, Briana, RN as Nurse Coordinator  Belgica Webber, RN as Nurse Coordinator    Copy to patient  YUE HARDING KEVIN  [address] [place] [address]

## 2018-06-29 NOTE — MR AVS SNAPSHOT
After Visit Summary   6/29/2018    Brionna Griffin    MRN: [id number]           Patient Information     Date Of Birth          1995        Visit Information        Provider Department      6/29/2018 9:00 AM Remigio Phelps MD Peds Dermatology        Today's Diagnoses     Linear morphea    -  1      Care Instructions    [facility]- Pediatric Dermatology  Dr. Michelle Sullivan, Dr. Dena Bishop, Dr. Remigio Phelps, Dr. Filomena Gardner, Dr. Derick Wright       Pediatric Appointment Scheduling and Call Center [phone]     Non Urgent -Triage Voicemail Line; [phone]- Regina and Nora RN's. Messages are checked periodically throughout the day and are returned as soon as possible.      Clinic Fax number: [phone]    If you need a prescription refill, please contact your pharmacy. They will send us an electronic request. Refills are approved or denied by our Physicians during normal business hours, Monday through Fridays    Per office policy, refills will not be granted if you have not been seen within the past year (or sooner depending on your child's condition)    *Radiology Scheduling- [phone]  *Sedation Unit Scheduling- [phone]  *[place] Scheduling- General [phone]; Pediatric Dermatology [phone]  *Main  Services: [phone]   Indonesian: [phone]   Tanzanian: [phone]   Hmong/Yakut/Lithuanian: [phone]    For urgent matters that cannot wait until the next business day, is over a holiday and/or a weekend please call [phone] and ask for the Dermatology Resident On-Call to be paged.                         Follow-ups after your visit        Follow-up notes from your care team     Return in about 6 months (around 12/29/2018) for Routine Visit.      Who to contact     Please call your clinic at [phone] to:    Ask questions about your health    Make or cancel appointments    Discuss your  medicines    Learn about your test results    Speak to your doctor            Additional Information About Your Visit        MyChart Information     Hot Potatohart is an electronic gateway that provides easy, online access to your medical records. With Hot Potatohart, you can request a clinic appointment, read your test results, renew a prescription or communicate with your care team.     To sign up for Gro Intellig[employer]et visit the website at [email or web address]   You will be asked to enter the access code listed below, as well as some personal information. Please follow the directions to create your username and password.     Your access code is: [email or web address]  Expires: 2018  2:47 PM     Your access code will  in 90 days. If you need help or a new code, please contact your [facility] or call [phone] for assistance.        Care EveryWhere ID     This is your Care EveryWhere ID. This could be used by other organizations to access your [place] medical records  [id number]         Blood Pressure from Last 3 Encounters:   18 103/67   17 120/68   17 120/68    Weight from Last 3 Encounters:   18 66.2 kg (145 lb 15.1 oz)   17 67.7 kg (149 lb 4 oz)   17 67.7 kg (149 lb 4 oz)              Today, you had the following     No orders found for display       Primary Care Provider Office Phone # Fax #    Aleaheda Moreno [phone]       [facility] [address] [facility] [address]        Equal Access to Services     WALLACE RAMÍREZ : Hadii aad ku hadasho Soomaali, waaxda luqadaha, qaybta kaalmada adeegyada, facundo lópez. So [facility] [phone].    ATENCIÓN: Si habla español, tiene a redmond disposición servicios gratuitos de asistencia lingüística. Llame al [phone].    We comply with applicable federal civil rights laws and [place] laws. We do not discriminate on the basis of race, color, national  origin, age, disability, sex, sexual orientation, or gender identity.            Thank you!     [facility] your [facility]. Our goal is always to provide you with excellent care. Hearing back from our patients is one way we can continue to improve our services. Please take a few minutes to complete the written survey that you may receive in the mail after your visit with us. Thank you!             Your Updated Medication List - Protect others around you: Learn how to safely use, store and throw away your medicines at www.disposemymeds.org.          This list is accurate as of 6/29/18 11:59 PM.  Always use your most recent med list.                   Brand Name Dispense Instructions for use Diagnosis    amphetamine-dextroamphetamine 30 MG per 24 hr capsule    ADDERALL XR     Take by mouth daily        etonogestrel 68 MG Impl    IMPLANON/NEXPLANON     To be used as directed        * folic acid 1 MG tablet    FOLVITE     Take 1 mg by mouth        * folic acid 1 MG tablet    FOLVITE    90 tablet    Take 1 tablet (1 mg) by mouth daily    Morphea       methotrexate 2.5 MG tablet CHEMO     130 tablet    Take 25 mg orally weekly    Morphea       mycophenolate 500 MG tablet    GENERIC EQUIVALENT    360 tablet    Take 2 tablets (1,000 mg) by mouth 2 times daily    Morphea       tacrolimus 0.03 % ointment    PROTOPIC    60 g    Apply topically 2 times daily Apply bid to face and neck as directed 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 Read the directions carefully, and ask your doctor or other care provider to review them with you.

## 2018-06-29 NOTE — PROGRESS NOTES
[facility] Dermatology Note      Dermatology Problem List:  1. 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 Prominent facial and neck veins    Encounter Date: Jun 29, 2018    CC:   Chief Complaint   Patient presents with     RECHECK     morphea       History of Present Illness:  Ms. Brionna Griffin is a 22 year old female who presents as a follow-up for linear morphea. The patient was last seen 12/22/17 at which time her MMF was maintained at 1000mg BID and MTX 25mg weekly (takes 12.5mg Friday and Saturday). Since then, the patient and her mother both feel like her morphea has been stable. She acknowledges that when looking at her face and neck every day it is hard to appreciate subtle differences, but her mother who goes several months in between seeing Tad while she is away at school also feels like things have been stable. As such, they are interested in possibly decreasing her therapies. The patient has tolerated her methotrexate and mycophenolate mofetil well without any noticeable side effects. She is otherwise feeling well and has no other skin concerns at this time.     Past Medical History:   Patient Active Problem List   Diagnosis     Morphea     Long-term use of immunosuppressant medication     Attention deficit hyperactivity disorder (ADHD), predominantly hyperactive type     Past Medical History:   Diagnosis Date     Attention deficit hyperactivity disorder (ADHD), predominantly hyperactive type 11/8/2005     Long-term use of immunosuppressant medication 6/26/2017     No past surgical history on file.    Social History: Patient studies at [employer], majors in elementary education.    Medications:  Current Outpatient Prescriptions   Medication Sig Dispense Refill     amphetamine-dextroamphetamine (ADDERALL XR) 30 MG per capsule Take by mouth daily       Ascorbic Acid (VITAMIN C PO) Take  1,000 mg by mouth       etonogestrel (IMPLANON/NEXPLANON) 68 MG IMPL To be used as directed       folic acid (FOLVITE) 1 MG tablet Take 1 tablet (1 mg) by mouth daily 90 tablet 3     methotrexate 2.5 MG tablet CHEMO Take 25 mg orally weekly 130 tablet 3     mycophenolate (GENERIC EQUIVALENT) 500 MG tablet Take 2 tablets (1,000 mg) by mouth 2 times daily 360 tablet 6     tacrolimus (PROTOPIC) 0.03 % ointment Apply topically 2 times daily Apply bid to face and neck as directed  Patient has linear scleroderma and has had numerous topical steroids in the past 60 g 1        No Known Allergies      Review of Systems:  -As per HPI  -Constitutional: The patient denies fatigue, fevers, chills, unintended weight loss, and night sweats.  -HEENT: Patient denies nonhealing oral sores.  -Skin: As above in HPI. No additional skin concerns.    Physical exam:  Vitals: There were no vitals taken for this visit.  GEN: This is a well developed, well-nourished female in no acute distress, in a pleasant mood.    SKIN: Focused examination of the face, neck, scalp, right and left hands, and finger nails was performed.  -Linear atrophic plaque of the medial forehead extending from the anterior medial scalp and extending to the glabella. Today measures 18.3 cm in the cranial/caudal dimension.  -Mildly atrophic subtly hyperpigmented plaque on the right lateral neck with prominent underlying veins. Loss of subQ fat compared to the contralateral side. Today measures 11 x 8 cm   -No other lesions of concern on areas examined.                 Impression/Plan:  1. Morphea with en coup de sabre: Compared to last visit, the patient's morphea appears to have been stable on our examination. We discussed the possibility of tapering off her mycophenolate mofetil while continuing the methotrexate. However, after discussion with Dr. Shaw, he felt that perhaps there may be some widening of involvement on the nose or nasal bridge. It is always a challenge  in our morphea patients to determine activity and progression as it may be quite subtle and subjective. Brionna revealed to Dr. Shaw that she is only taking/getting in about half of her current MMF dosing. As such, we decided to continue current treatment.   The patient was reminded that we would like her to have a panorex performed to follow on the possibility of dental anomalies associated with linear morphea.     -Continue MMF 1000mg BID and MTX 25mg Qweek, per rheumatology.  -Encouraged pt to continue to follow sun protective measures to minimize the appearence of the morphea on the neck  -Encouraged follow-up with her orthodontist for a repeat panoramic XR. We discussed with family that there have been case reports of patients with linea morphea also with affected shortened teeth.     CC Dr. Seamus Shaw on close of this encounter.    Follow-up in 6 months, earlier for new or changing lesions.     Staffed with Remigio Phelps MD  Resident (Ray Stanley MD) / Staff (as above)    I have personally examined this patient and agree with the resident's documentation and plan of care.  I have reviewed and amended the resident's note above.  The documentation accurately reflects my clinical observations, diagnoses, treatment and follow-up plans.     Remigio Phelps MD  , Pediatric Dermatology

## 2018-06-29 NOTE — PATIENT INSTRUCTIONS
[facility] Pediatric Rheumatology    For Help:  The Pediatric Call Center at [phone] can help with scheduling of routine follow up visits.  Lupis Flores and Nadira Jeffrey are the Nurse Coordinators for the Division of Pediatric Rheumatology and can be reached directly at [phone]. They can help with questions about your child s rheumatic condition, medications, and test results.   Please try to schedule infusions 3 months in advance.  Please try to give us 72 hours or longer notice if you need to cancel infusions so other patients can benefit from this opening).  Note: Insurance authorization must be obtained before any infusion can be scheduled. If you change health insurance, you must notify our office as soon as possible, so that the infusion can be reauthorized.    For emergencies after hours or on the weekends, please call the page  at [phone] and ask to speak to the physician on-call for Pediatric Rheumatology. Please do not use [employer] for urgent requests.  Main  Services:  [phone]  o Hmong/Israeli/Serbian: [phone]  o Angolan: [phone]  o Hungarian: [phone]

## 2018-06-29 NOTE — NURSING NOTE
"Chief Complaint   Patient presents with     Follow Up For     Morphea     /67 (BP Location: Left arm, Patient Position: Sitting, Cuff Size: Adult Regular)  Pulse 81  Temp 97.6  F (36.4  C) (Oral)  Ht 5' 3.07\" (160.2 cm)  Wt 145 lb 15.1 oz (66.2 kg)  BMI 25.79 kg/m2    Margarita Ochoa LPN    "

## 2018-07-20 ENCOUNTER — TELEPHONE (OUTPATIENT)
Dept: DERMATOLOGY | Facility: CLINIC | Age: 23
End: 2018-07-20

## 2018-07-20 NOTE — TELEPHONE ENCOUNTER
"Contacted Brionna, unavailable at time of phone call. Spoke to pts mother about disc received. Mom aware disk was sent to clinic. Requested correct formatted disk was explained to mom. Mom verbalized understanding and they will \"try\" to get this form of disk to clinic. RN verbalized understanding. Dr. Phelps updated, will close encounter at this time.   "

## 2018-07-20 NOTE — TELEPHONE ENCOUNTER
----- Message from Silvia Marrero sent at 7/20/2018  8:25 AM CDT -----  Regarding: Disc update   Is an  Needed: no  If yes, Which Language:    Callers Name: Nimesh   Callers Phone Number: [phone]  Relationship to Patient: Rep   Best time of day to call: anytime   Is it ok to leave a detailed voicemail on this number: yes  Reason for Call: Nimesh called in to notify derm clinic that patient is needing a new disc. A disc was dropped off yesterday to the [facility] film room. Nimesh stated disc is corrupted. They are needing new disc in Dicom format or jpeg. Dr. Phelps name was written on disc also.

## 2018-12-21 ENCOUNTER — RECORDS - HEALTHEAST (OUTPATIENT)
Dept: ADMINISTRATIVE | Facility: OTHER | Age: 23
End: 2018-12-21

## 2018-12-21 ENCOUNTER — OFFICE VISIT (OUTPATIENT)
Dept: RHEUMATOLOGY | Facility: CLINIC | Age: 23
End: 2018-12-21
Attending: PEDIATRICS
Payer: COMMERCIAL

## 2018-12-21 VITALS
HEART RATE: 83 BPM | TEMPERATURE: 97.8 F | HEIGHT: 63 IN | DIASTOLIC BLOOD PRESSURE: 68 MMHG | BODY MASS INDEX: 25.39 KG/M2 | WEIGHT: 143.3 LBS | SYSTOLIC BLOOD PRESSURE: 102 MMHG

## 2018-12-21 DIAGNOSIS — Z23 NEED FOR PROPHYLACTIC VACCINATION AND INOCULATION AGAINST INFLUENZA: ICD-10-CM

## 2018-12-21 DIAGNOSIS — L94.0 MORPHEA: Primary | ICD-10-CM

## 2018-12-21 LAB
ALBUMIN SERPL-MCNC: 3.6 G/DL (ref 3.4–5)
ALP SERPL-CCNC: 64 U/L (ref 40–150)
ALT SERPL W P-5'-P-CCNC: 20 U/L (ref 0–50)
AST SERPL W P-5'-P-CCNC: 16 U/L (ref 0–45)
BASOPHILS # BLD AUTO: 0.1 10E9/L (ref 0–0.2)
BASOPHILS NFR BLD AUTO: 1 %
BILIRUB DIRECT SERPL-MCNC: <0.1 MG/DL (ref 0–0.2)
BILIRUB SERPL-MCNC: 0.5 MG/DL (ref 0.2–1.3)
CREAT SERPL-MCNC: 0.67 MG/DL (ref 0.52–1.04)
CRP SERPL-MCNC: <2.9 MG/L (ref 0–8)
DIFFERENTIAL METHOD BLD: NORMAL
EOSINOPHIL # BLD AUTO: 0.1 10E9/L (ref 0–0.7)
EOSINOPHIL NFR BLD AUTO: 2.1 %
ERYTHROCYTE [DISTWIDTH] IN BLOOD BY AUTOMATED COUNT: 14.5 % (ref 10–15)
ERYTHROCYTE [SEDIMENTATION RATE] IN BLOOD BY WESTERGREN METHOD: 3 MM/H (ref 0–20)
GFR SERPL CREATININE-BSD FRML MDRD: >90 ML/MIN/{1.73_M2}
HCT VFR BLD AUTO: 37.4 % (ref 35–47)
HGB BLD-MCNC: 11.9 G/DL (ref 11.7–15.7)
IMM GRANULOCYTES # BLD: 0 10E9/L (ref 0–0.4)
IMM GRANULOCYTES NFR BLD: 0.2 %
LYMPHOCYTES # BLD AUTO: 2.1 10E9/L (ref 0.8–5.3)
LYMPHOCYTES NFR BLD AUTO: 41 %
MCH RBC QN AUTO: 29.2 PG (ref 26.5–33)
MCHC RBC AUTO-ENTMCNC: 31.8 G/DL (ref 31.5–36.5)
MCV RBC AUTO: 92 FL (ref 78–100)
MONOCYTES # BLD AUTO: 0.4 10E9/L (ref 0–1.3)
MONOCYTES NFR BLD AUTO: 7.3 %
NEUTROPHILS # BLD AUTO: 2.5 10E9/L (ref 1.6–8.3)
NEUTROPHILS NFR BLD AUTO: 48.4 %
NRBC # BLD AUTO: 0 10*3/UL
NRBC BLD AUTO-RTO: 0 /100
PLATELET # BLD AUTO: 343 10E9/L (ref 150–450)
PROT SERPL-MCNC: 6.9 G/DL (ref 6.8–8.8)
RBC # BLD AUTO: 4.07 10E12/L (ref 3.8–5.2)
WBC # BLD AUTO: 5.2 10E9/L (ref 4–11)

## 2018-12-21 PROCEDURE — 85025 COMPLETE CBC W/AUTO DIFF WBC: CPT | Performed by: PEDIATRICS

## 2018-12-21 PROCEDURE — 86140 C-REACTIVE PROTEIN: CPT | Performed by: PEDIATRICS

## 2018-12-21 PROCEDURE — 85652 RBC SED RATE AUTOMATED: CPT | Performed by: PEDIATRICS

## 2018-12-21 PROCEDURE — G0008 ADMIN INFLUENZA VIRUS VAC: HCPCS | Mod: ZF

## 2018-12-21 PROCEDURE — 36415 COLL VENOUS BLD VENIPUNCTURE: CPT | Performed by: PEDIATRICS

## 2018-12-21 PROCEDURE — 82565 ASSAY OF CREATININE: CPT | Performed by: PEDIATRICS

## 2018-12-21 PROCEDURE — 80076 HEPATIC FUNCTION PANEL: CPT | Performed by: PEDIATRICS

## 2018-12-21 PROCEDURE — G0463 HOSPITAL OUTPT CLINIC VISIT: HCPCS | Mod: 25,ZF

## 2018-12-21 PROCEDURE — 90686 IIV4 VACC NO PRSV 0.5 ML IM: CPT | Mod: ZF

## 2018-12-21 PROCEDURE — 25000128 H RX IP 250 OP 636: Mod: ZF

## 2018-12-21 ASSESSMENT — MIFFLIN-ST. JEOR: SCORE: 1386.51

## 2018-12-21 ASSESSMENT — PAIN SCALES - GENERAL: PAINLEVEL: NO PAIN (0)

## 2018-12-21 NOTE — PROGRESS NOTES

## 2018-12-21 NOTE — LETTER
12/21/2018    RE: Brionna Griffin  [address] [place] [address]     Injectable Influenza Immunization Documentation    1.  Is the person to be vaccinated sick today?   No    2. Does the person to be vaccinated have an allergy to a component   of the vaccine?   No  Egg Allergy Algorithm Link    3. Has the person to be vaccinated ever had a serious reaction   to influenza vaccine in the past?   No    4. Has the person to be vaccinated ever had Guillain-Barré syndrome?   No    Form completed by Mary Zuniga CMA                    Medications:   As of completion of this visit:  Current Outpatient Medications   Medication Sig Dispense Refill     amphetamine-dextroamphetamine (ADDERALL XR) 30 MG per capsule Take by mouth daily       Ascorbic Acid (VITAMIN C PO) Take 1,000 mg by mouth       etonogestrel (IMPLANON/NEXPLANON) 68 MG IMPL To be used as directed       folic acid (FOLVITE) 1 MG tablet Take 1 mg by mouth       folic acid (FOLVITE) 1 MG tablet Take 1 tablet (1 mg) by mouth daily 90 tablet 3     methotrexate 2.5 MG tablet CHEMO Take 25 mg orally weekly 130 tablet 3     mycophenolate (GENERIC EQUIVALENT) 500 MG tablet Take 2 tablets (1,000 mg) by mouth 2 times daily 360 tablet 6     tacrolimus (PROTOPIC) 0.03 % ointment Apply topically 2 times daily Apply bid to face and neck as directed  Patient has linear scleroderma and has had numerous topical steroids in the past (Patient not taking: Reported on 12/21/2018) 60 g 1            Allergies:   No Known Allergies        Problem list:     Patient Active Problem List    Diagnosis Date Noted     Long-term use of immunosuppressant medication 06/26/2017     Morphea 03/04/2015     Attention deficit hyperactivity disorder (ADHD), predominantly hyperactive type 11/08/2005            Subjective:     Brionna is a 23 year old female who was seen in Pediatric Rheumatology clinic today for follow up.  Brionna was last seen in our clinic on 6/29/2018 and returns today  "accompanied by her mother.  The primary encounter diagnosis was Morphea. A diagnosis of Need for prophylactic vaccination and inoculation against influenza was also pertinent to this visit.      Goals for the visit include follow-up, including labs.  Prescribed medications have been administered regularly, without missed doses, and the medications have been tolerated well, without side effects.  We have set a goal last time of her taking the CellCept more regularly, and she feels she has been very successful with this.    With regard to her skin involvement, she feels as though the forehead area has expanded, and is more rectangular.  She has no symptoms associated with it.  She is not sure that there are any changes with regard to her neck or upper chest.  Her mother has been an astute observer, but has not seen much of her daughter due to her busy schedule and does not have an opinion today.    Comprehensive Review of Systems is otherwise negative for any new concerns at this time.           Examination:   Blood pressure 102/68, pulse 83, temperature 97.8  F (36.6  C), height 1.604 m (5' 3.15\"), weight 65 kg (143 lb 4.8 oz).  Normalized weight-for-age data not available for patients older than 20 years.  Normalized stature-for-age data not available for patients older than 20 years.        Brionna appears generally well and in good spirits.  Head: Normal head and hair.  Eyes: No scleral injection, pupils normal.  Ears: Normal external structures, tympanic membranes.  Nose: No cartilage deformity, congestion.  Mouth: Normal teeth, gums, tongue, mucosa.  Throat: Normal, without erythema or exudate.  Neck: Normal, without thyromegaly  Nodes: No cervical, supraclavicular, axillary, inguinal adenopathy.  Lungs: Normal effort, clear to ausculation.  Heart: Regular rate and rhythm, S1 and S2, no murmurs; normal peripheral pulses and perfusion.  Abdomen: Soft, non-tender, without hepatomegaly, splenomegaly, or " masses.  Skin: There is an appearance on the right forehead that the border to the right side is more vertical than before.  There does not appear to be any active edge.  I do not see anything significantly different on the nose cheek or proximal neck.  Just proximal to each clavicle there is mild asymmetry of the subcutaneous tissue, but below this there is symmetry despite slight color difference.  There are no discrete borders for any of this neck or upper chest involvement to my eyes, so only thickness can really be judged clearly.  Normal scratches and bruises.  Nails: No pitting, infection.  Neurological: Alert, appropriately interactive, normal cranial nerves, no deficits, normal gait walking and running.  Musculoskeletal: No evidence of current synovitis of the cervical spine, TMJ, sternoclavicular, acromioclavicular, glenohumeral, elbow, wrist, finger, lumbar spine, hip, knee, ankle, or toe joints. No tendonitis or bursitis.            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  She is getting her medicine consistently, but this appears not to be sufficient.  I think it is time to again discuss alternatives, such as Actemra.  I think after she sees Dr. Phelps we could talk about best options and see what is feasible.  If we were able to get Actemra, then I think I would hold the CellCept.  If there is a brisk response then I might next hold the methotrexate.         Plan:     1. Laboratory monitoring today and every 3-4 months to monitor medications and disease activity as long as she is taking methotrexate and/or CellCept.  A standing order was provided.  2. No imaging is needed today.  3. Medications unchanged for the time being.  4. Flu shot will be completed.  5. Follow-up in 4-6 months.       If there are any new questions or concerns, I would be glad to help and can be reached through our main office at [phone] or our paging   at [phone].    Seamus Shaw MD           Addendum:  Laboratory Investigations:     Results for orders placed or performed in visit on 12/21/18 (from the past 48 hour(s))   CBC with platelets differential   Result Value Ref Range    WBC 5.2 4.0 - 11.0 10e9/L    RBC Count 4.07 3.8 - 5.2 10e12/L    Hemoglobin 11.9 11.7 - 15.7 g/dL    Hematocrit 37.4 35.0 - 47.0 %    MCV 92 78 - 100 fl    MCH 29.2 26.5 - 33.0 pg    MCHC 31.8 31.5 - 36.5 g/dL    RDW 14.5 10.0 - 15.0 %    Platelet Count 343 150 - 450 10e9/L    Diff Method Automated Method     % Neutrophils 48.4 %    % Lymphocytes 41.0 %    % Monocytes 7.3 %    % Eosinophils 2.1 %    % Basophils 1.0 %    % Immature Granulocytes 0.2 %    Nucleated RBCs 0 0 /100    Absolute Neutrophil 2.5 1.6 - 8.3 10e9/L    Absolute Lymphocytes 2.1 0.8 - 5.3 10e9/L    Absolute Monocytes 0.4 0.0 - 1.3 10e9/L    Absolute Eosinophils 0.1 0.0 - 0.7 10e9/L    Absolute Basophils 0.1 0.0 - 0.2 10e9/L    Abs Immature Granulocytes 0.0 0 - 0.4 10e9/L    Absolute Nucleated RBC 0.0    Creatinine   Result Value Ref Range    Creatinine 0.67 0.52 - 1.04 mg/dL    GFR Estimate >90 >60 mL/min/[1.73_m2]    GFR Estimate If Black >90 >60 mL/min/[1.73_m2]   Hepatic panel   Result Value Ref Range    Bilirubin Direct <0.1 0.0 - 0.2 mg/dL    Bilirubin Total 0.5 0.2 - 1.3 mg/dL    Albumin 3.6 3.4 - 5.0 g/dL    Protein Total 6.9 6.8 - 8.8 g/dL    Alkaline Phosphatase 64 40 - 150 U/L    ALT 20 0 - 50 U/L    AST 16 0 - 45 U/L   Erythrocyte sedimentation rate auto   Result Value Ref Range    Sed Rate 3 0 - 20 mm/h   CRP inflammation   Result Value Ref Range    CRP Inflammation <2.9 0.0 - 8.0 mg/L    These results are reassuring.     Seamus Shaw MD    CC  Patient Care Team:  Aleah Moreno MD as PCP - General (Pediatrics)  Seamus Shaw MD as Referring Physician (Pediatrics)  Remigio Phelps MD as MD (Dermatology)  Schwab, Briana, RN as Nurse Coordinator  Belgica Webber RN as  Nurse Coordinator  SELF, REFERRED    Copy to patient  YUE HARDING KEVIN  [address] [facility] [address]

## 2018-12-21 NOTE — NURSING NOTE
"Chief Complaint   Patient presents with     RECHECK     morphea     /68   Pulse 83   Temp 97.8  F (36.6  C)   Ht 5' 3.15\" (160.4 cm)   Wt 143 lb 4.8 oz (65 kg)   BMI 25.26 kg/m    Mary Zuniga CMA    "

## 2018-12-21 NOTE — PATIENT INSTRUCTIONS
[facility] Pediatric Rheumatology    For Help:  The Pediatric Call Center at [phone] can help with scheduling of routine follow up visits.  Lupis Flores and Nadira Jeffrey are the Nurse Coordinators for the Division of Pediatric Rheumatology and can be reached directly at [phone]. They can help with questions about your child s rheumatic condition, medications, and test results.   Please try to schedule infusions 3 months in advance.  Please try to give us 72 hours or longer notice if you need to cancel infusions so other patients can benefit from this opening).  Note: Insurance authorization must be obtained before any infusion can be scheduled. If you change health insurance, you must notify our office as soon as possible, so that the infusion can be reauthorized.    For emergencies after hours or on the weekends, please call the page  at [phone] and ask to speak to the physician on-call for Pediatric Rheumatology. Please do not use [employer] for urgent requests.  Main  Services:  [phone]  o Hmong/Somali/Syriac: [phone]  o Slovenian: [phone]  o Brazilian: [phone]

## 2018-12-21 NOTE — PROGRESS NOTES
Medications:   As of completion of this visit:  Current Outpatient Medications   Medication Sig Dispense Refill     amphetamine-dextroamphetamine (ADDERALL XR) 30 MG per capsule Take by mouth daily       Ascorbic Acid (VITAMIN C PO) Take 1,000 mg by mouth       etonogestrel (IMPLANON/NEXPLANON) 68 MG IMPL To be used as directed       folic acid (FOLVITE) 1 MG tablet Take 1 mg by mouth       folic acid (FOLVITE) 1 MG tablet Take 1 tablet (1 mg) by mouth daily 90 tablet 3     methotrexate 2.5 MG tablet CHEMO Take 25 mg orally weekly 130 tablet 3     mycophenolate (GENERIC EQUIVALENT) 500 MG tablet Take 2 tablets (1,000 mg) by mouth 2 times daily 360 tablet 6     tacrolimus (PROTOPIC) 0.03 % ointment Apply topically 2 times daily Apply bid to face and neck as directed  Patient has linear scleroderma and has had numerous topical steroids in the past (Patient not taking: Reported on 12/21/2018) 60 g 1            Allergies:   No Known Allergies        Problem list:     Patient Active Problem List    Diagnosis Date Noted     Long-term use of immunosuppressant medication 06/26/2017     Morphea 03/04/2015     Attention deficit hyperactivity disorder (ADHD), predominantly hyperactive type 11/08/2005            Subjective:     Brionna is a 23 year old female who was seen in Pediatric Rheumatology clinic today for follow up.  Brionna was last seen in our clinic on 6/29/2018 and returns today accompanied by her mother.  The primary encounter diagnosis was Morphea. A diagnosis of Need for prophylactic vaccination and inoculation against influenza was also pertinent to this visit.      Goals for the visit include follow-up, including labs.  Prescribed medications have been administered regularly, without missed doses, and the medications have been tolerated well, without side effects.  We have set a goal last time of her taking the CellCept more regularly, and she feels she has been very successful with this.    With regard  "to her skin involvement, she feels as though the forehead area has expanded, and is more rectangular.  She has no symptoms associated with it.  She is not sure that there are any changes with regard to her neck or upper chest.  Her mother has been an astute observer, but has not seen much of her daughter due to her busy schedule and does not have an opinion today.    Comprehensive Review of Systems is otherwise negative for any new concerns at this time.           Examination:   Blood pressure 102/68, pulse 83, temperature 97.8  F (36.6  C), height 1.604 m (5' 3.15\"), weight 65 kg (143 lb 4.8 oz).  Normalized weight-for-age data not available for patients older than 20 years.  Normalized stature-for-age data not available for patients older than 20 years.        Brionna appears generally well and in good spirits.  Head: Normal head and hair.  Eyes: No scleral injection, pupils normal.  Ears: Normal external structures, tympanic membranes.  Nose: No cartilage deformity, congestion.  Mouth: Normal teeth, gums, tongue, mucosa.  Throat: Normal, without erythema or exudate.  Neck: Normal, without thyromegaly  Nodes: No cervical, supraclavicular, axillary, inguinal adenopathy.  Lungs: Normal effort, clear to ausculation.  Heart: Regular rate and rhythm, S1 and S2, no murmurs; normal peripheral pulses and perfusion.  Abdomen: Soft, non-tender, without hepatomegaly, splenomegaly, or masses.  Skin: There is an appearance on the right forehead that the border to the right side is more vertical than before.  There does not appear to be any active edge.  I do not see anything significantly different on the nose cheek or proximal neck.  Just proximal to each clavicle there is mild asymmetry of the subcutaneous tissue, but below this there is symmetry despite slight color difference.  There are no discrete borders for any of this neck or upper chest involvement to my eyes, so only thickness can really be judged clearly.  Normal " scratches and bruises.  Nails: No pitting, infection.  Neurological: Alert, appropriately interactive, normal cranial nerves, no deficits, normal gait walking and running.  Musculoskeletal: No evidence of current synovitis of the cervical spine, TMJ, sternoclavicular, acromioclavicular, glenohumeral, elbow, wrist, finger, lumbar spine, hip, knee, ankle, or toe joints. No tendonitis or bursitis.            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  She is getting her medicine consistently, but this appears not to be sufficient.  I think it is time to again discuss alternatives, such as Actemra.  I think after she sees Dr. Phelps we could talk about best options and see what is feasible.  If we were able to get Actemra, then I think I would hold the CellCept.  If there is a brisk response then I might next hold the methotrexate.         Plan:     1. Laboratory monitoring today and every 3-4 months to monitor medications and disease activity as long as she is taking methotrexate and/or CellCept.  A standing order was provided.  2. No imaging is needed today.  3. Medications unchanged for the time being.  4. Flu shot will be completed.  5. Follow-up in 4-6 months.       If there are any new questions or concerns, I would be glad to help and can be reached through our main office at [phone] or our paging  at [phone].    Seamus Shaw MD           Addendum:  Laboratory Investigations:     Results for orders placed or performed in visit on 12/21/18 (from the past 48 hour(s))   CBC with platelets differential   Result Value Ref Range    WBC 5.2 4.0 - 11.0 10e9/L    RBC Count 4.07 3.8 - 5.2 10e12/L    Hemoglobin 11.9 11.7 - 15.7 g/dL    Hematocrit 37.4 35.0 - 47.0 %    MCV 92 78 - 100 fl    MCH 29.2 26.5 - 33.0 pg    MCHC 31.8 31.5 - 36.5 g/dL    RDW 14.5 10.0 - 15.0 %    Platelet Count 343 150 - 450 10e9/L    Diff Method Automated Method      % Neutrophils 48.4 %    % Lymphocytes 41.0 %    % Monocytes 7.3 %    % Eosinophils 2.1 %    % Basophils 1.0 %    % Immature Granulocytes 0.2 %    Nucleated RBCs 0 0 /100    Absolute Neutrophil 2.5 1.6 - 8.3 10e9/L    Absolute Lymphocytes 2.1 0.8 - 5.3 10e9/L    Absolute Monocytes 0.4 0.0 - 1.3 10e9/L    Absolute Eosinophils 0.1 0.0 - 0.7 10e9/L    Absolute Basophils 0.1 0.0 - 0.2 10e9/L    Abs Immature Granulocytes 0.0 0 - 0.4 10e9/L    Absolute Nucleated RBC 0.0    Creatinine   Result Value Ref Range    Creatinine 0.67 0.52 - 1.04 mg/dL    GFR Estimate >90 >60 mL/min/[1.73_m2]    GFR Estimate If Black >90 >60 mL/min/[1.73_m2]   Hepatic panel   Result Value Ref Range    Bilirubin Direct <0.1 0.0 - 0.2 mg/dL    Bilirubin Total 0.5 0.2 - 1.3 mg/dL    Albumin 3.6 3.4 - 5.0 g/dL    Protein Total 6.9 6.8 - 8.8 g/dL    Alkaline Phosphatase 64 40 - 150 U/L    ALT 20 0 - 50 U/L    AST 16 0 - 45 U/L   Erythrocyte sedimentation rate auto   Result Value Ref Range    Sed Rate 3 0 - 20 mm/h   CRP inflammation   Result Value Ref Range    CRP Inflammation <2.9 0.0 - 8.0 mg/L    These results are reassuring.       CC  Patient Care Team:  Aleah Moreno MD as PCP - General (Pediatrics)  Seamus Shaw MD as Referring Physician (Pediatrics)  Remigio Phelps MD as MD (Dermatology)  Schwab, Briana, RN as Nurse Coordinator  Belgica Webber, RN as Nurse Coordinator  SELF, REFERRED    Copy to patient  [facility]  [address] [place] [address]

## 2018-12-28 ENCOUNTER — OFFICE VISIT (OUTPATIENT)
Dept: DERMATOLOGY | Facility: CLINIC | Age: 23
End: 2018-12-28
Attending: DERMATOLOGY
Payer: COMMERCIAL

## 2018-12-28 ENCOUNTER — RECORDS - HEALTHEAST (OUTPATIENT)
Dept: ADMINISTRATIVE | Facility: OTHER | Age: 23
End: 2018-12-28

## 2018-12-28 VITALS
SYSTOLIC BLOOD PRESSURE: 107 MMHG | WEIGHT: 142.86 LBS | HEART RATE: 77 BPM | DIASTOLIC BLOOD PRESSURE: 75 MMHG | BODY MASS INDEX: 25.31 KG/M2 | HEIGHT: 63 IN

## 2018-12-28 DIAGNOSIS — L94.1 LINEAR MORPHEA: Primary | ICD-10-CM

## 2018-12-28 PROCEDURE — G0463 HOSPITAL OUTPT CLINIC VISIT: HCPCS | Mod: ZF

## 2018-12-28 ASSESSMENT — PAIN SCALES - GENERAL: PAINLEVEL: NO PAIN (0)

## 2018-12-28 ASSESSMENT — MIFFLIN-ST. JEOR: SCORE: 1384.51
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
## 2018-12-28 NOTE — PATIENT INSTRUCTIONS
[facility]- Pediatric Dermatology  Dr. Michelle Sullivan, Dr. Dena Bishop, Dr. Remigio Phelps, Dr. Filomena Gardner, Dr. Derick Wright       Will discuss with Dr. Shaw ongoing cares and forming a definitive plan.     For now continue current medications as prescribed.         Pediatric Appointment Scheduling and Call Center [phone]     Non Urgent -Triage Voicemail Line; [phone]- Regina and Nora RN's. Messages are checked periodically throughout the day and are returned as soon as possible.      Clinic Fax number: [phone]    If you need a prescription refill, please contact your pharmacy. They will send us an electronic request. Refills are approved or denied by our Physicians during normal business hours, Monday through Fridays    Per office policy, refills will not be granted if you have not been seen within the past year (or sooner depending on your child's condition)    *Radiology Scheduling- [phone]  *Sedation Unit Scheduling- [phone]  *[place] Scheduling- General [phone]; Pediatric Dermatology [phone]  *Main  Services: [phone]   Welsh: [phone]   Peruvian: [phone]   Hmong/Gerard/Luther: [phone]    For urgent matters that cannot wait until the next business day, is over a holiday and/or a weekend please call [phone] and ask for the Dermatology Resident On-Call to be paged.

## 2018-12-28 NOTE — NURSING NOTE
"Chief Complaint   Patient presents with     RECHECK     Morphea     /75   Pulse 77   Ht 5' 3.15\" (160.4 cm)   Wt 142 lb 13.7 oz (64.8 kg)   BMI 25.19 kg/m    Mary Zuniga CMA    "

## 2019-01-10 ENCOUNTER — OFFICE VISIT - HEALTHEAST (OUTPATIENT)
Dept: PEDIATRICS | Facility: CLINIC | Age: 24
End: 2019-01-10

## 2019-01-10 DIAGNOSIS — L94.0 MORPHEA: ICD-10-CM

## 2019-01-10 DIAGNOSIS — J32.9 SINUSITIS, UNSPECIFIED CHRONICITY, UNSPECIFIED LOCATION: ICD-10-CM

## 2019-01-10 ASSESSMENT — MIFFLIN-ST. JEOR: SCORE: 1390.34

## 2019-01-14 DIAGNOSIS — L94.0 MORPHEA: ICD-10-CM

## 2019-01-14 RX ORDER — MYCOPHENOLATE MOFETIL 500 MG/1
1000 TABLET ORAL 2 TIMES DAILY
Qty: 360 TABLET | Refills: 6 | Status: SHIPPED | OUTPATIENT
Start: 2019-01-14 | End: 2019-12-20

## 2019-01-14 RX ORDER — FOLIC ACID 1 MG/1
1 TABLET ORAL DAILY
Qty: 90 TABLET | Refills: 3 | Status: SHIPPED | OUTPATIENT
Start: 2019-01-14 | End: 2019-12-20

## 2019-01-21 ENCOUNTER — TELEPHONE (OUTPATIENT)
Dept: RHEUMATOLOGY | Facility: CLINIC | Age: 24
End: 2019-01-21

## 2019-01-21 NOTE — TELEPHONE ENCOUNTER
Brionna is calling to tell Dr. Shaw that she would like to switch to Actemra IV if it is approved by her insurance.   She has several questions for Dr. Shaw and wanted to ask him to call her @ [phone].

## 2019-01-24 DIAGNOSIS — L94.0 SCLERODERMA, LOCALIZED: ICD-10-CM

## 2019-03-06 NOTE — TELEPHONE ENCOUNTER
Unable to leave message any of the times I have called.  I have left a message on the home number and asked them to have Brionna call back.

## 2019-05-20 ENCOUNTER — RECORDS - HEALTHEAST (OUTPATIENT)
Dept: ADMINISTRATIVE | Facility: OTHER | Age: 24
End: 2019-05-20

## 2019-05-20 ENCOUNTER — AMBULATORY - HEALTHEAST (OUTPATIENT)
Dept: LAB | Facility: CLINIC | Age: 24
End: 2019-05-20

## 2019-05-20 ENCOUNTER — COMMUNICATION - HEALTHEAST (OUTPATIENT)
Dept: TELEHEALTH | Facility: CLINIC | Age: 24
End: 2019-05-20

## 2019-05-20 DIAGNOSIS — L94.0 MORPHEA: ICD-10-CM

## 2019-05-20 LAB
ALBUMIN SERPL-MCNC: 4.5 G/DL (ref 3.5–5)
ALP SERPL-CCNC: 67 U/L (ref 45–120)
ALT SERPL W P-5'-P-CCNC: 22 U/L (ref 0–45)
AST SERPL W P-5'-P-CCNC: 23 U/L (ref 0–40)
BASOPHILS # BLD AUTO: 0 THOU/UL (ref 0–0.2)
BASOPHILS NFR BLD AUTO: 0 % (ref 0–2)
BILIRUB DIRECT SERPL-MCNC: 0.1 MG/DL
BILIRUB SERPL-MCNC: 0.3 MG/DL (ref 0–1)
CREAT SERPL-MCNC: 0.77 MG/DL (ref 0.6–1.1)
EOSINOPHIL # BLD AUTO: 0.2 THOU/UL (ref 0–0.4)
EOSINOPHIL NFR BLD AUTO: 3 % (ref 0–6)
ERYTHROCYTE [DISTWIDTH] IN BLOOD BY AUTOMATED COUNT: 12.9 % (ref 11–14.5)
GFR SERPL CREATININE-BSD FRML MDRD: >60 ML/MIN/1.73M2
HCT VFR BLD AUTO: 40.2 % (ref 35–47)
HGB BLD-MCNC: 12.9 G/DL (ref 12–16)
LYMPHOCYTES # BLD AUTO: 1.8 THOU/UL (ref 0.8–4.4)
LYMPHOCYTES NFR BLD AUTO: 26 % (ref 20–40)
MCH RBC QN AUTO: 29.1 PG (ref 27–34)
MCHC RBC AUTO-ENTMCNC: 32.2 G/DL (ref 32–36)
MCV RBC AUTO: 90 FL (ref 80–100)
MONOCYTES # BLD AUTO: 0.4 THOU/UL (ref 0–0.9)
MONOCYTES NFR BLD AUTO: 6 % (ref 2–10)
NEUTROPHILS # BLD AUTO: 4.5 THOU/UL (ref 2–7.7)
NEUTROPHILS NFR BLD AUTO: 65 % (ref 50–70)
PLATELET # BLD AUTO: 350 THOU/UL (ref 140–440)
PMV BLD AUTO: 7.6 FL (ref 7–10)
PROT SERPL-MCNC: 7.4 G/DL (ref 6–8)
RBC # BLD AUTO: 4.45 MILL/UL (ref 3.8–5.4)
WBC: 7 THOU/UL (ref 4–11)

## 2019-05-21 LAB
ABSOLUTE LYMPHOCYTES (EXTERNAL): 1.8 (ref 0.8–4.4)
ABSOLUTE NEUTROPHILS (EXTERNAL): 4.5 (ref 2–7.7)
ALBUMIN (EXTERNAL): 4.5 (ref 3.5–5)
ALT SERPL-CCNC: 22 U/L (ref 0–45)
AST SERPL-CCNC: 23 U/L (ref 0–40)
BILIRUB SERPL-MCNC: 0.3 MG/DL (ref 0–1)
CREATININE (EXTERNAL): 0.77 (ref 0.6–1.1)
HEMOGLOBIN: 12.9 G/DL (ref 12–16)
PLATELET # BLD AUTO: 350 10^9/L (ref 140–440)
WBC # BLD AUTO: 7 10^9/L (ref 4–11)

## 2019-05-24 ENCOUNTER — RECORDS - HEALTHEAST (OUTPATIENT)
Dept: ADMINISTRATIVE | Facility: OTHER | Age: 24
End: 2019-05-24

## 2019-05-24 ENCOUNTER — OFFICE VISIT (OUTPATIENT)
Dept: RHEUMATOLOGY | Facility: CLINIC | Age: 24
End: 2019-05-24
Attending: PEDIATRICS
Payer: COMMERCIAL

## 2019-05-24 VITALS
HEIGHT: 63 IN | RESPIRATION RATE: 16 BRPM | TEMPERATURE: 98 F | HEART RATE: 76 BPM | BODY MASS INDEX: 23.87 KG/M2 | DIASTOLIC BLOOD PRESSURE: 71 MMHG | SYSTOLIC BLOOD PRESSURE: 107 MMHG | WEIGHT: 134.7 LBS

## 2019-05-24 DIAGNOSIS — L94.0 MORPHEA: Primary | Chronic | ICD-10-CM

## 2019-05-24 PROCEDURE — G0463 HOSPITAL OUTPT CLINIC VISIT: HCPCS | Mod: ZF

## 2019-05-24 ASSESSMENT — PAIN SCALES - GENERAL: PAINLEVEL: NO PAIN (0)

## 2019-05-24 ASSESSMENT — MIFFLIN-ST. JEOR: SCORE: 1351.25

## 2019-05-24 NOTE — PROGRESS NOTES
Medications:   As of completion of this visit:  Current Outpatient Medications   Medication Sig Dispense Refill     amphetamine-dextroamphetamine (ADDERALL XR) 30 MG per capsule Take by mouth daily       Ascorbic Acid (VITAMIN C PO) Take 1,000 mg by mouth       etonogestrel (IMPLANON/NEXPLANON) 68 MG IMPL To be used as directed       folic acid (FOLVITE) 1 MG tablet Take 1 tablet (1 mg) by mouth daily 90 tablet 3     folic acid (FOLVITE) 1 MG tablet Take 1 mg by mouth       methotrexate 2.5 MG tablet Take 25 mg orally weekly 130 tablet 1     mycophenolate (GENERIC EQUIVALENT) 500 MG tablet Take 2 tablets (1,000 mg) by mouth 2 times daily 360 tablet 6     tacrolimus (PROTOPIC) 0.03 % ointment Apply topically 2 times daily Apply bid to face and neck as directed  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  Brionna was last seen in our clinic on 12/21/2018 and returns today accompanied by her mother.  The encounter diagnosis was Morphea (localized cutaneous scleroderma).      Goals for the visit include follow-up and discussion of medication options.  Prescribed medications have been administered regularly, without missed doses, and the medications have been tolerated well, without side effects.  Last time she asked whether she could start IV Actemra, and we drafted orders to seek approval but it turned out she had questions.  We are unable to contact her to answer those  "questions by phone despite multiple attempts (see telephone notation).  She will now be here in the [place] for the summer and would like to reconsider this.  Overall she feels that there has been no significant change to her skin recently.  She has noted some asymmetry to her upper lip, which is concerning for progressive involvement.  She still has her loose central right upper incisor in this area also but it is not become more loose    She is rather tired today because of final exams.  Comprehensive Review of Systems is otherwise negative for any new health concerns.           Examination:   Blood pressure 107/71, pulse 76, temperature 98  F (36.7  C), temperature source Oral, resp. rate 16, height 1.61 m (5' 3.39\"), weight 61.1 kg (134 lb 11.2 oz).  Normalized weight-for-age data not available for patients older than 20 years.  Blood pressure percentiles are not available for patients who are 18 years or older.    Brionna appears generally well and in good spirits.  Head: Normal head and hair.  Eyes: No scleral injection, pupils normal.  Ears: Normal external structures  Nose: No cartilage deformity, congestion.  Mouth: Normal teeth, gums, tongue, mucosa.  Throat: Normal, without erythema or exudate.  Neck: Normal, without thyromegaly  Nodes: No cervical, supraclavicular, axillary, inguinal adenopathy.  Lungs: Normal effort, clear to ausculation.  Heart: Regular rate and rhythm, S1 and S2, no murmurs; normal peripheral pulses and perfusion.  Abdomen: Soft, non-tender, without hepatomegaly, splenomegaly, or masses.  Skin: See photos from today that demonstrate pattern of involvement still on the face (forehead, behind the right ear and down onto the right neck), with veins more easily seen because of loss of subcutaneous fatty tissue.  Also see photos showing asymmetry of the upper lip.  Nails: No pitting, infection.  Neurological: Alert, appropriately interactive, normal cranial nerves, no deficits, normal " gait walking.   Musculoskeletal: No evidence of current synovitis of the cervical spine, TMJ, sternoclavicular, acromioclavicular, glenohumeral, elbow, wrist, finger, lumbar spine, hip, knee, ankle, or toe joints. No tendonitis or bursitis.                    Last Lab Results:     No visits with results within 1 Day(s) from this visit.   Latest known visit with results is:   Abstract on 05/21/2019   Component Date Value     WBC 05/20/2019 7.0      Hemoglobin 05/20/2019 12.9      Platelet Count 05/20/2019 350      Absolute Neutrophils (Ex* 05/20/2019 4.5      Absolute Lymphocytes (Ex* 05/20/2019 1.8      Bilirubin Total (Externa* 05/20/2019 0.3      Albumin (External) 05/20/2019 4.5      AST (External) 05/20/2019 23      ALT (External) 05/20/2019 22      Creatinine (External) 05/20/2019 0.77             Assessment:     Localized cutaneous scleroderma, prominently involving the right forehead, down onto the right neck, slowly progressive over time.  We never really see an active border, and she has never symptomatic in terms of pain, but it really has been a clearly progressive process when viewed over a long period of time.  I still would like to see if there is therapy that could arrest progression, and and therefore open an opportunity to reduce maintenance therapy.  Even if we cannot get things to reverse, I think it is worth a try at least to achieve better stability and reduce the need for ongoing maintenance immunosuppressive and anti-inflammatory therapy.  For reasons previously outlined, Actemra is a particularly good choice and I think we can learn in the relatively short course whether it will work for her.         Plan:     1. Laboratory monitoring every 3-4 months to monitor medications and disease activity.  2. Medications as listed, but we will seek authorization for Actemra as previously outlined.  3. Follow-up in 3-6 months.   There earlier time will be if there is new therapy started in the later  time will be if we are just continuing this maintenance plan.       If there are any new questions or concerns, I would be glad to help and can be reached through our main office at [phone] or our paging  at [phone].    Seamus Shaw MD            Patient Care Team:  Aleah Moreno MD as PCP - General (Pediatrics)  Seamus Shaw MD as Referring Physician (Pediatrics)  Remigio Phelps MD as MD (Dermatology)  Schwab, Briana, RN as Nurse Coordinator  Belgica Webber, RN as Nurse Coordinator  SELF, REFERRED    Copy to patient  YUE HARDING KEVIN  [address] [place] [address]

## 2019-05-24 NOTE — NURSING NOTE
"Chief Complaint   Patient presents with     Arthritis     Morphea (localized cutaneous scleroderma).     Vitals:    05/24/19 1024   BP: 107/71   BP Location: Right arm   Patient Position: Chair   Cuff Size: Adult Regular   Pulse: 76   Resp: 16   Temp: 98  F (36.7  C)   TempSrc: Oral   Weight: 134 lb 11.2 oz (61.1 kg)   Height: 5' 3.39\" (161 cm)      Carmencita Romero M.A.  May 24, 2019  "

## 2019-05-24 NOTE — PATIENT INSTRUCTIONS
[facility] Pediatric Rheumatology    For Help:  The Pediatric Call Center at [phone] can help with scheduling of routine follow up visits.  Lupis Flores and Nadira Jeffrey are the Nurse Coordinators for the Division of Pediatric Rheumatology and can be reached directly at [phone]. They can help with questions about your child s rheumatic condition, medications, and test results.   Please try to schedule infusions 3 months in advance.  Please try to give us 72 hours or longer notice if you need to cancel infusions so other patients can benefit from this opening).  Note: Insurance authorization must be obtained before any infusion can be scheduled. If you change health insurance, you must notify our office as soon as possible, so that the infusion can be reauthorized.    For emergencies after hours or on the weekends, please call the page  at [phone] and ask to speak to the physician on-call for Pediatric Rheumatology. Please do not use [employer] for urgent requests.  Main  Services:  [phone]  o Hmong/Macedonian/Japanese: [phone]  o Barbadian: [phone]  o Martiniquais: [phone]

## 2019-05-24 NOTE — LETTER
5/24/2019      RE: Brionna Griffin  [address]   [facility] [address]                      Medications:   As of completion of this visit:  Current Outpatient Medications   Medication Sig Dispense Refill     amphetamine-dextroamphetamine (ADDERALL XR) 30 MG per capsule Take by mouth daily       Ascorbic Acid (VITAMIN C PO) Take 1,000 mg by mouth       etonogestrel (IMPLANON/NEXPLANON) 68 MG IMPL To be used as directed       folic acid (FOLVITE) 1 MG tablet Take 1 tablet (1 mg) by mouth daily 90 tablet 3     folic acid (FOLVITE) 1 MG tablet Take 1 mg by mouth       methotrexate 2.5 MG tablet Take 25 mg orally weekly 130 tablet 1     mycophenolate (GENERIC EQUIVALENT) 500 MG tablet Take 2 tablets (1,000 mg) by mouth 2 times daily 360 tablet 6     tacrolimus (PROTOPIC) 0.03 % ointment Apply topically 2 times daily Apply bid to face and neck as directed  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  Brionna was last seen in our clinic on 12/21/2018 and returns today accompanied by her mother.  The encounter diagnosis was Morphea (localized cutaneous scleroderma).      Goals for the visit include follow-up and discussion of medication options.  Prescribed medications have been administered regularly, without missed doses, and the medications have been tolerated well, without side effects.  Last time she asked whether she could start IV Actemra, and we drafted orders to seek approval but  "it turned out she had questions.  We are unable to contact her to answer those questions by phone despite multiple attempts (see telephone notation).  She will now be here in the [place] for the summer and would like to reconsider this.  Overall she feels that there has been no significant change to her skin recently.  She has noted some asymmetry to her upper lip, which is concerning for progressive involvement.  She still has her loose central right upper incisor in this area also but it is not become more loose    She is rather tired today because of final exams.  Comprehensive Review of Systems is otherwise negative for any new health concerns.           Examination:   Blood pressure 107/71, pulse 76, temperature 98  F (36.7  C), temperature source Oral, resp. rate 16, height 1.61 m (5' 3.39\"), weight 61.1 kg (134 lb 11.2 oz).  Normalized weight-for-age data not available for patients older than 20 years.  Blood pressure percentiles are not available for patients who are 18 years or older.    Brionna appears generally well and in good spirits.  Head: Normal head and hair.  Eyes: No scleral injection, pupils normal.  Ears: Normal external structures  Nose: No cartilage deformity, congestion.  Mouth: Normal teeth, gums, tongue, mucosa.  Throat: Normal, without erythema or exudate.  Neck: Normal, without thyromegaly  Nodes: No cervical, supraclavicular, axillary, inguinal adenopathy.  Lungs: Normal effort, clear to ausculation.  Heart: Regular rate and rhythm, S1 and S2, no murmurs; normal peripheral pulses and perfusion.  Abdomen: Soft, non-tender, without hepatomegaly, splenomegaly, or masses.  Skin: See photos from today that demonstrate pattern of involvement still on the face (forehead, behind the right ear and down onto the right neck), with veins more easily seen because of loss of subcutaneous fatty tissue.  Also see photos showing asymmetry of the upper lip.  Nails: No pitting, infection.  Neurological: " Alert, appropriately interactive, normal cranial nerves, no deficits, normal gait walking.   Musculoskeletal: No evidence of current synovitis of the cervical spine, TMJ, sternoclavicular, acromioclavicular, glenohumeral, elbow, wrist, finger, lumbar spine, hip, knee, ankle, or toe joints. No tendonitis or bursitis.                    Last Lab Results:     No visits with results within 1 Day(s) from this visit.   Latest known visit with results is:   Abstract on 05/21/2019   Component Date Value     WBC 05/20/2019 7.0      Hemoglobin 05/20/2019 12.9      Platelet Count 05/20/2019 350      Absolute Neutrophils (Ex* 05/20/2019 4.5      Absolute Lymphocytes (Ex* 05/20/2019 1.8      Bilirubin Total (Externa* 05/20/2019 0.3      Albumin (External) 05/20/2019 4.5      AST (External) 05/20/2019 23      ALT (External) 05/20/2019 22      Creatinine (External) 05/20/2019 0.77             Assessment:     Localized cutaneous scleroderma, prominently involving the right forehead, down onto the right neck, slowly progressive over time.  We never really see an active border, and she has never symptomatic in terms of pain, but it really has been a clearly progressive process when viewed over a long period of time.  I still would like to see if there is therapy that could arrest progression, and and therefore open an opportunity to reduce maintenance therapy.  Even if we cannot get things to reverse, I think it is worth a try at least to achieve better stability and reduce the need for ongoing maintenance immunosuppressive and anti-inflammatory therapy.  For reasons previously outlined, Actemra is a particularly good choice and I think we can learn in the relatively short course whether it will work for her.         Plan:     1. Laboratory monitoring every 3-4 months to monitor medications and disease activity.  2. Medications as listed, but we will seek authorization for Actemra as previously outlined.  3. Follow-up in 3-6 months.    There earlier time will be if there is new therapy started in the later time will be if we are just continuing this maintenance plan.       If there are any new questions or concerns, I would be glad to help and can be reached through our main office at [phone] or our paging  at [phone].    Seamus Shaw MD          CC  Patient Care Team:  Aleah Moreno MD as PCP - General (Pediatrics)  Remigio Phelps MD as MD (Dermatology)  Schwab, Briana, RN as Nurse Coordinator  Belgica Webber, RN as Nurse Coordinator    Copy to patient  Parent(s) of Brionna Griffin  [address]  [place] [address]

## 2019-07-15 DIAGNOSIS — L94.0 MORPHEA: Primary | ICD-10-CM

## 2019-10-11 ENCOUNTER — DOCUMENTATION ONLY (OUTPATIENT)
Dept: DERMATOLOGY | Facility: CLINIC | Age: 24
End: 2019-10-11

## 2019-10-11 NOTE — PROGRESS NOTES
Spoke to patient mother. Informed her of the [employer] news segment that will be airing on the [employer]. The camera did go up and down the hallway so there is a possibility that her child may be in the segment. Received a verbal consent from patients mom approving her child to be shown.     Jordyn Beltran, [facility]

## 2019-12-20 ENCOUNTER — OFFICE VISIT (OUTPATIENT)
Dept: RHEUMATOLOGY | Facility: CLINIC | Age: 24
End: 2019-12-20
Attending: PEDIATRICS
Payer: COMMERCIAL

## 2019-12-20 ENCOUNTER — RECORDS - HEALTHEAST (OUTPATIENT)
Dept: ADMINISTRATIVE | Facility: OTHER | Age: 24
End: 2019-12-20

## 2019-12-20 VITALS
WEIGHT: 143.52 LBS | SYSTOLIC BLOOD PRESSURE: 115 MMHG | HEIGHT: 63 IN | DIASTOLIC BLOOD PRESSURE: 65 MMHG | HEART RATE: 89 BPM | BODY MASS INDEX: 25.43 KG/M2 | TEMPERATURE: 98.5 F

## 2019-12-20 DIAGNOSIS — L94.0 MORPHEA: Primary | Chronic | ICD-10-CM

## 2019-12-20 PROCEDURE — G0463 HOSPITAL OUTPT CLINIC VISIT: HCPCS | Mod: ZF

## 2019-12-20 ASSESSMENT — MIFFLIN-ST. JEOR: SCORE: 1386.25

## 2019-12-20 ASSESSMENT — PAIN SCALES - GENERAL: PAINLEVEL: NO PAIN (0)

## 2019-12-20 NOTE — PATIENT INSTRUCTIONS
Schedule dermatology follow up    [facility] Pediatric Rheumatology    For Help:  The Pediatric Call Center at [phone] can help with scheduling of routine follow up visits.  Nadira Jeffrey and Sharlene Garcia are the Nurse Coordinators for the Division of Pediatric Rheumatology and can be reached directly at [phone]. They can help with questions about your child s rheumatic condition, medications, and test results.  For emergencies after hours or on the weekends, please call the page  at [phone] and ask to speak to the physician on-call for Pediatric Rheumatology. Please do not use [employer] for urgent requests.  Main  Services:  [phone]  o Hmong/Gibraltarian/Slovak: [phone]  o Paraguayan: [phone]  o Serbian: [phone]    For Patient Education Materials:  [email or web address] [email or web address]

## 2019-12-20 NOTE — PROGRESS NOTES
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  Brionna was last seen in our clinic on 5/24/2019.  The encounter diagnosis was Morphea (localized cutaneous scleroderma).  Goals for the visit include follow-up.      The plan was made to discontinue her medicine and she has been off methotrexate and CellCept.  She is not sure whether there is been it changed but she does think there may be some additional color on the right neck.  She is also noticed that the lowest portion of each earlobe is different but she thinks this is just something that she overlooked before.  The right side is thinner.  There is been no extension of the involvement of her face or forehead in the superior direction.  Her hair is stable.  She has not developed new lesions elsewhere. Comprehensive Review of Systems is otherwise negative for any new health concerns.  Despite the changes in her skin she is glad to be off the medication she was on previously.         Examination:   Blood pressure 115/65, pulse 89, temperature 98.5  F (36.9  C), temperature source Oral, height 1.61 m (5' 3.39\"), weight 65.1 kg (143 lb 8.3 oz).  Normalized weight-for-age data not available for patients older than 20 years.  Blood pressure percentiles are not available " for patients who are 18 years or older.    Brionna appears generally well and in good spirits.    The lowest portion of each earlobe was palpated and in fact the skin is somewhat thinner on the right side.  There is no discoloration such as hyperpigmentation, hypopigmentation, or vascular dilatation.      No changes are apparent in her scalp.  Her anterior hairline seems to be stable.  There is no change in the appearance of her forehead involvement.  Photos were obtained and are in the media tab.    There is a somewhat violaceous color to the right side of the neck.  The skin is thinner but this is not a change from before.  We attempted multiple photos to catch the difference, and they do not accurately reflect what we are seeing in the room.  In one photo there is actually color change in areas where we did not think there was significant involvement.  These are under the media tab.  The extent of the involvement seems to be from the mastoid area on the right side down to the clavicle however.  There does not appear to be any bony involvement.         Last Lab Results:     No visits with results within 1 Day(s) from this visit.   Latest known visit with results is:   Abstract on 05/21/2019   Component Date Value     WBC 05/20/2019 7.0      Hemoglobin 05/20/2019 12.9      Platelet Count 05/20/2019 350      Absolute Neutrophils (Ex* 05/20/2019 4.5      Absolute Lymphocytes (Ex* 05/20/2019 1.8      Bilirubin Total (Externa* 05/20/2019 0.3      Albumin (External) 05/20/2019 4.5      AST (External) 05/20/2019 23      ALT (External) 05/20/2019 22      Creatinine (External) 05/20/2019 0.77             Assessment:   History of morphea involving the forehead and scalp, with later extension down onto the right neck.  It seemed quite clear-cut that methotrexate and CellCept were not effective therapies for her as there had been slow progression.  However today it seems like things are brighter and more extensive than we saw  previously.  I discussed with her the possibility that today's exam just is not representative or significant given that we could not even adequately document things with pictures, and that perhaps it would be better to get a second exam with Dr. Phelps to see if things appear to be progressing by her assessment.  If there is definite progression then I would consider an appeal again for Actemra or Orencia.    We also discussed that she is now outside the age for which we normally follow patients in our clinic.  If she needs ongoing dermatology follow-up I think it would be best to see Dr. Filomena Pradhan and if she needs ongoing rheumatology follow-up it would be best to see Dr. Allen Reagan.       Plan:       1. Laboratory tests are not needed today as they have not correlated with disease activity.  2. No imaging is needed today.  We have not found previous attempt at MRI to identify active edges in other patients as particularly effective.  3. No medications need to be started.    4. I recommend that she see Dr. Phelps again.  I could also possibly see her one more time in 5 months when school is out.  Otherwise, if additional follow-up is needed then I would recommend individuals mentioned above.  If it would help to have me do a brief letter of introduction I would be glad to do so.    If there are any new questions or concerns, I would be glad to help and can be reached through our main office at [phone] or our paging  at [phone].    Seamus Shaw MD    This note was dictated and might contain unintended typographical errors missed in proofreading.  If there are questions/concerns, please contact the author.    CC  Patient Care Team:  Aleah Moreno MD as PCP - General (Pediatrics)  Seamus Shaw MD as Referring Physician (Pediatrics)  Remigio Phelps MD as MD (Dermatology)  Schwab, Briana, RN as Nurse Coordinator  Belgica Webber, RN as Nurse  Coordinator  SELF, REFERRED    Copy to patient  YUE HARDING KEVIN  [address] [place] [address]

## 2019-12-20 NOTE — LETTER
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  Brionna was last seen in our clinic on 5/24/2019.  The encounter diagnosis was Morphea (localized cutaneous scleroderma).  Goals for the visit include follow-up.      The plan was made to discontinue her medicine and she has been off methotrexate and CellCept.  She is not sure whether there is been it changed but she does think there may be some additional color on the right neck.  She is also noticed that the lowest portion of each earlobe is different but she thinks this is just something that she overlooked before.  The right side is thinner.  There is been no extension of the involvement of her face or forehead in the superior direction.  Her hair is stable.  She has not developed new lesions elsewhere. Comprehensive Review of Systems is otherwise negative for any new health concerns.  Despite the changes in her skin she is glad to be off the medication she was on previously.         Examination:   Blood pressure 115/65, pulse 89, temperature 98.5  F (36.9  C), temperature source Oral, height 1.61 m (5' 3.39\"), weight 65.1 kg (143 lb 8.3 oz).  Normalized weight-for-age data not " available for patients older than 20 years.  Blood pressure percentiles are not available for patients who are 18 years or older.    Brionna appears generally well and in good spirits.    The lowest portion of each earlobe was palpated and in fact the skin is somewhat thinner on the right side.  There is no discoloration such as hyperpigmentation, hypopigmentation, or vascular dilatation.      No changes are apparent in her scalp.  Her anterior hairline seems to be stable.  There is no change in the appearance of her forehead involvement.  Photos were obtained and are in the media tab.    There is a somewhat violaceous color to the right side of the neck.  The skin is thinner but this is not a change from before.  We attempted multiple photos to catch the difference, and they do not accurately reflect what we are seeing in the room.  In one photo there is actually color change in areas where we did not think there was significant involvement.  These are under the media tab.  The extent of the involvement seems to be from the mastoid area on the right side down to the clavicle however.  There does not appear to be any bony involvement.         Last Lab Results:     No visits with results within 1 Day(s) from this visit.   Latest known visit with results is:   Abstract on 05/21/2019   Component Date Value     WBC 05/20/2019 7.0      Hemoglobin 05/20/2019 12.9      Platelet Count 05/20/2019 350      Absolute Neutrophils (Ex* 05/20/2019 4.5      Absolute Lymphocytes (Ex* 05/20/2019 1.8      Bilirubin Total (Externa* 05/20/2019 0.3      Albumin (External) 05/20/2019 4.5      AST (External) 05/20/2019 23      ALT (External) 05/20/2019 22      Creatinine (External) 05/20/2019 0.77             Assessment:   History of morphea involving the forehead and scalp, with later extension down onto the right neck.  It seemed quite clear-cut that methotrexate and CellCept were not effective therapies for her as there had been slow  progression.  However today it seems like things are brighter and more extensive than we saw previously.  I discussed with her the possibility that today's exam just is not representative or significant given that we could not even adequately document things with pictures, and that perhaps it would be better to get a second exam with Dr. Phelps to see if things appear to be progressing by her assessment.  If there is definite progression then I would consider an appeal again for Actemra or Orencia.    We also discussed that she is now outside the age for which we normally follow patients in our clinic.  If she needs ongoing dermatology follow-up I think it would be best to see Dr. Filomena Pradhan and if she needs ongoing rheumatology follow-up it would be best to see Dr. Allen Reagan.       Plan:       1. Laboratory tests are not needed today as they have not correlated with disease activity.  2. No imaging is needed today.  We have not found previous attempt at MRI to identify active edges in other patients as particularly effective.  3. No medications need to be started.    4. I recommend that she see Dr. Phelps again.  I could also possibly see her one more time in 5 months when school is out.  Otherwise, if additional follow-up is needed then I would recommend individuals mentioned above.  If it would help to have me do a brief letter of introduction I would be glad to do so.    If there are any new questions or concerns, I would be glad to help and can be reached through our main office at [phone] or our paging  at [phone].    Seamus Shaw MD    This note was dictated and might contain unintended typographical errors missed in proofreading.  If there are questions/concerns, please contact the author.    CC  Patient Care Team:  Aleah Moreno MD as PCP - General (Pediatrics)  Remigio Phelps MD as MD (Dermatology)  Schwab, Briana, RN as Nurse Coordinator  Akbar  Belgica Arango, RN as Nurse Coordinator    Copy to patient  YUE HARDING KEVIN  [address] [facility] [facility]   [facility] [address]

## 2019-12-20 NOTE — NURSING NOTE
"Chief Complaint   Patient presents with     RECHECK     Morphea (localized cutaneous scleroderma)     /65 (BP Location: Right arm, Patient Position: Sitting, Cuff Size: Adult Regular)   Pulse 89   Temp 98.5  F (36.9  C) (Oral)   Ht 5' 3.39\" (161 cm)   Wt 143 lb 8.3 oz (65.1 kg)   BMI 25.11 kg/m      Patricia Michelle LPN    "

## 2020-06-16 ENCOUNTER — TELEPHONE (OUTPATIENT)
Dept: RHEUMATOLOGY | Facility: CLINIC | Age: 25
End: 2020-06-16

## 2020-06-16 ENCOUNTER — TELEPHONE (OUTPATIENT)
Dept: DERMATOLOGY | Facility: CLINIC | Age: 25
End: 2020-06-16

## 2020-06-16 DIAGNOSIS — L94.0 SCLERODERMA, LOCALIZED: Primary | ICD-10-CM

## 2020-06-16 NOTE — TELEPHONE ENCOUNTER
----- Message from Margarita Ochoa LPN sent at 6/15/2020 12:27 PM CDT -----  Regarding: Transition to Adult Rheum  Hi Nadira!    Brionna Griffin is transitioning from Peds to Adult Rheum and Derm. I was wondering if you or Abdulkadir could help get her set up with the provider's Dr. Shaw has referred her to?  He would like her to establish care with Dr. Allen Reagan in Rheum and Dr. Filomena Pradhan for Derm. Let me know if there's anything else you need from me!    Thanks so much,     Margarita

## 2020-06-16 NOTE — TELEPHONE ENCOUNTER
Patient referred for scleroderma by Seamus Shaw, patient last seen by Dr. Phelps in 2018. Called to schedule follow up, no answer, left message with direct number notifying.

## 2020-06-16 NOTE — TELEPHONE ENCOUNTER
I left a message with Hui to call [phone] to call and set up appointment with adult rheumatology. I asked  to place referrals to both adult rheum and derm for Brionna. I asked for her to call back with any questions.

## 2020-06-16 NOTE — TELEPHONE ENCOUNTER
----- Message from Nadira Jeffrey RN sent at 6/16/2020 11:32 AM CDT -----  Pho[place] Elizabeth, Would you please place referral to adult rheum and adult derm for [facility] for Brionna? I was asked by Margarita in clinic to help with this. I left a message with Brionna and told her to call and schedule. Thanks!    Nadira

## 2020-06-17 ENCOUNTER — TELEPHONE (OUTPATIENT)
Dept: DERMATOLOGY | Facility: CLINIC | Age: 25
End: 2020-06-17

## 2020-06-17 NOTE — TELEPHONE ENCOUNTER
[facility] Call Center    Phone Message    May a detailed message be left on voicemail: yes     Reason for Call: Other: Pt is referred by Dr Seamus Shaw from Peds Rheumatology at  to Dermatology for Scleroderma, localized. Records and referral are in Epic for review, thanks!     Action Taken: Message routed to:  [facility] Dermatology    Travel Screening: Not Applicable

## 2020-06-18 ENCOUNTER — TELEPHONE (OUTPATIENT)
Dept: RHEUMATOLOGY | Facility: CLINIC | Age: 25
End: 2020-06-18

## 2020-06-18 NOTE — TELEPHONE ENCOUNTER
Transfer of care from Peds Rheum for scleroderma.     Sending to RN for review.     Jane Martinez CMA   6/18/2020 2:11 PM

## 2020-06-22 NOTE — TELEPHONE ENCOUNTER
Patient returned call and has been scheduled for phone visit 7/21. Sent lark link and provided activation code because patient was not receiving text, also provided email address.

## 2020-06-23 NOTE — TELEPHONE ENCOUNTER
Brionna is being referred for Morphea/localized Scleroderma.     Called Brionna on 06/23/20 11:01 AM and left a message asking that pt return call. If patient calls back please , I ask that the Call center contact RN Care Coordinator at [phone],so that I can connect with pt.    We recommend that pt be seen by Rheum/Derm for her above dx.    NNEKA Hernandes RN  Scleroderma Care Coordinator  [facility]

## 2020-07-21 ENCOUNTER — VIRTUAL VISIT (OUTPATIENT)
Dept: DERMATOLOGY | Facility: CLINIC | Age: 25
End: 2020-07-21
Attending: DERMATOLOGY
Payer: COMMERCIAL

## 2020-07-21 ENCOUNTER — RECORDS - HEALTHEAST (OUTPATIENT)
Dept: ADMINISTRATIVE | Facility: OTHER | Age: 25
End: 2020-07-21

## 2020-07-21 DIAGNOSIS — L94.1 LINEAR MORPHEA: Primary | ICD-10-CM

## 2020-07-21 NOTE — LETTER
"  7/21/2020      RE: Brionna Griffin  [address] [place] [address]       Brionna who is being evaluated via a billable teledermatology visit.             The patient has been notified of following:            \"We have asked you to send in photos via [employer] or e-mail. These photos will be seen and reviewed by an MD or PAEmmanuelC.  A telederm visit is not as thorough as an in-person visit, photo assessment does not replace an in-person skin exam.  The quality of the photograph sent may not be of the same quality as that taken by the dermatology clinic. With that being said, we have found that certain health care needs can be provided without the need for a physical exam.  This service lets us provide the care you need with a short phone conversation. If prescriptions are needed we can send directly to your pharmacy.If lab work is needed we can place an order for that and you can then stop by our lab to have the test done at a later time. An MD/PA/Resident will call you around the time of your visit. This may be from a blocked number.     This is a billable visit. If during the course of the call the physician/provider feels a telephone visit is not appropriate, you will not be charged for this service.            Patient has given verbal consent for Telephone visit?  Yes           The patient would like to proceed with an teledermatology because of the COVID Pandemic.     Patient complains of    Scleroderma follow up        ALLERGIES REVIEWED?  yes  Pediatric Dermatology- Review of Systems Questions (return patient)          Goal for today's visit? Continuation of treatment     IN THE LAST 2 WEEKS     Fever- no     Mouth/Throat Sores- no/no     Weight Gain/Loss - no/no     Cough/Wheezing- no/no     Change in Appetite- no     Chest Discomfort/Heartburn - no/no     Bone Pain- no     Nausea/Vomiting - no/no     Joint Pain/Swelling - no/no     Constipation/Diarrhea - no/no     Headaches/Dizziness/Change in Vision- " no/no/no     Pain with Urination- no     Ear Pain/Hearing Loss- no/no     Nasal Discharge/Bleeding- no/no     Sadness/Irritability- no/no     Anxiety/Moodiness-no/no             [facility] Pediatric Teledermatology Record    Impression and Recommendations (Patient Counseled on the Following):  1. Morphea with mandi fernandes, stable off therapy - no flare or progression of morphea since last visit.   - Discussed that photographs are encouraging in terms of overall stability of morphea, particularly given that she has been off all therapies for almost one full year. Recommended continuing off biologic therapy at this point.   - It would be appropriate for her to transition to adult rheumatology and dermatology as discussed with her rheumatologist. Will be following with Dr. Allen Reagan with Rheumatology. Provided recommendations for adult dermatologists (Dr. Derick Thorne or Dr. Filomena Pradhan) for further dermatologic care.   - Discussed diligent sun protection, particularly on the neck where discoloration is noticeable to her.   - Reviewed that she is likely not at increased risk for COVID-19 as compared to the general population given that she is currently off immunosuppressive medications.     Follow-up:   Follow-up with dermatology in approximately 3-6 months with adult dermatology as she transitions care. Earlier for new or changing lesions or rash.      Staff and student:    I, Carley Bains, MS4, served as a scribe during this encounter for Dr. Remigio Phelps MD.     I was present with the medical student who participated in the service and in the documentation of the note.  I have verified the history and personally performed the physical exam/image review and medical decision making.  I agree with the assessment and plan of care as documented in the note.   Remigio Phelps MD    ____________________________________________________________________    Dermatology Problem List:  1. Morphea with  en waqar fernandes, stable  -s/p long-term therapy with methotrexate and CellCept      2. Prominent facial and neck veins    Encounter Date: Jul 21, 2020    CC:   Chief Complaint   Patient presents with     Teledermatology     Teledermatology with photo review.        History of Present Illness:  I have reviewed the teledermatology information and the nursing intake corresponding to this issue. Brionna Griffin is a 24 year old female who presents via teledermatology for follow-up of linear morphea. She was last seen by dermatology 12/28/18, at which time she was recommended to continue MMF and methotrexate per rheumatology, and a trial off her medication was discussed.     Today, Mckenna notes that she has been off methotrexate and MMF since August 2019. She has experienced no significant flares or progression of morphea while off these medications. She believes her skin has been relatively stable overall - she does note some darker pigmentation of the lateral neck in the summertime with increased sun exposure. She tries to be diligent with her sun protection, but will forget to apply sunscreen occasionally. The progression of her forehead atrophy has possibly decreased slightly, and her hair has gotten thicker. She is not currently doing anything for treatment.     Brionna is wondering whether she is at increased risk for acquiring COVID-19 given her history. She has no other questions or skin concerns today.    ROS: Patient is generally feeling well today.     Physical Examination:  General: Well-appearing female, appropriately-developed individual. Mood pleasant, answers questions appropriately, interactive.   Skin: Focused examination of the face and neck within the teledermatology photographs was performed.   - Light purple to hyperpigmented plaque on the mid-right lateral neck. Appears improved compared to photographs from 12/28/18.  - Linear atrophic plaque of the medial forehead, extending from the anterior  medial scalp to the mid-glabella. Appears stable/somewhat improved compared to photographs from 12/28/18.               Comparison to photographs from 12/28/18:              Labs: No new.     Past Medical History:   Patient Active Problem List   Diagnosis     Long-term use of immunosuppressant medication     Attention deficit hyperactivity disorder (ADHD), predominantly hyperactive type     Scleroderma, localized     Past Medical History:   Diagnosis Date     Attention deficit hyperactivity disorder (ADHD), predominantly hyperactive type 11/8/2005     Long-term use of immunosuppressant medication 6/26/2017     Morphea (localized cutaneous scleroderma) 3/4/2015     No past surgical history on file.    Social History:  Brionna attends college at the [employer]. Plans to return in-person in the Fall.      Family History:  No family history on file.    Medications:  Current Outpatient Medications   Medication     amphetamine-dextroamphetamine (ADDERALL XR) 30 MG per capsule     Ascorbic Acid (VITAMIN C PO)     No current facility-administered medications for this visit.       No Known Allergies    ___________________________________________________________________________    Teledermatology information:  - Location of patient: Home  - Patient presented as: return  - Location of teledermatologist: [facility]  - Reason teledermatology is appropriate: National Emergency Regarding Coronavirus disease (COVID 19) Outbreak  - Method of transmission:  Store and Forward and Telephone Visit  - Image quality and interpretability: acceptable  - Physician has received verbal consent for a Video/Photos Visit from the patient? Yes  - In-person dermatology visit recommendation: no  - Date of images: 7/21/2020  - Service start time: 10:08am  - Service end time: 10:19am  - Date of report: 07/21/20      Remigio Phelps MD

## 2020-07-21 NOTE — PATIENT INSTRUCTIONS
[facility]- Pediatric Dermatology  Dr. Dena Bishop, Dr. Remigio Phelps, Dr. Filomena Sullivan, Dr. Rowena Gardner & Dr. Derick Wright       Non Urgent  Nurse Triage Line; [phone]- Regina and Nora RN Care Coordinators        If you need a prescription refill, please contact your pharmacy. Refills are approved or denied by our Physicians during normal business hours, Monday through Fridays    Per office policy, refills will not be granted if you have not been seen within the past year (or sooner depending on your child's condition)      Scheduling Information:     Pediatric Appointment Scheduling and Call Center [phone]   Radiology Scheduling- [phone]     Sedation Unit Scheduling- [phone]    [place] Scheduling- [facility] [phone]; Pediatric Dermatology [phone]    Main  Services: [phone]   Burundian: [phone]   Russian: [phone]   Hmong/Greek/Faroese: [phone]      Preadmission Nursing Department Fax Number: [phone] (Fax all pre-operative paperwork to this number)      For urgent matters arising during evenings, weekends, or holidays that cannot wait for normal business hours please call [phone] and ask for the Dermatology Resident On-Call to be paged.

## 2020-07-21 NOTE — PROGRESS NOTES
"Brionna who is being evaluated via a billable teledermatology visit.             The patient has been notified of following:            \"We have asked you to send in photos via [employer] or e-mail. These photos will be seen and reviewed by an MD or ELINA.  A telederm visit is not as thorough as an in-person visit, photo assessment does not replace an in-person skin exam.  The quality of the photograph sent may not be of the same quality as that taken by the dermatology clinic. With that being said, we have found that certain health care needs can be provided without the need for a physical exam.  This service lets us provide the care you need with a short phone conversation. If prescriptions are needed we can send directly to your pharmacy.If lab work is needed we can place an order for that and you can then stop by our lab to have the test done at a later time. An MD/PA/Resident will call you around the time of your visit. This may be from a blocked number.     This is a billable visit. If during the course of the call the physician/provider feels a telephone visit is not appropriate, you will not be charged for this service.            Patient has given verbal consent for Telephone visit?  Yes           The patient would like to proceed with an teledermatology because of the COVID Pandemic.     Patient complains of    Scleroderma follow up        ALLERGIES REVIEWED?  yes  Pediatric Dermatology- Review of Systems Questions (return patient)          Goal for today's visit? Continuation of treatment     IN THE LAST 2 WEEKS     Fever- no     Mouth/Throat Sores- no/no     Weight Gain/Loss - no/no     Cough/Wheezing- no/no     Change in Appetite- no     Chest Discomfort/Heartburn - no/no     Bone Pain- no     Nausea/Vomiting - no/no     Joint Pain/Swelling - no/no     Constipation/Diarrhea - no/no     Headaches/Dizziness/Change in Vision- no/no/no     Pain with Urination- no     Ear Pain/Hearing Loss- no/no     Nasal " Discharge/Bleeding- no/no     Sadness/Irritability- no/no     Anxiety/Moodiness-no/no

## 2020-07-21 NOTE — NURSING NOTE
Chief Complaint   Patient presents with     Teledermatology     Teledermatology with photo review.        There were no vitals taken for this visit.    Marcela Cruz CMA  July 21, 2020

## 2020-07-21 NOTE — PROGRESS NOTES
[facility] Pediatric Teledermatology Record    Impression and Recommendations (Patient Counseled on the Following):  1. Morphea with en coup de sabre, stable off therapy - no flare or progression of morphea since last visit.   - Discussed that photographs are encouraging in terms of overall stability of morphea, particularly given that she has been off all therapies for almost one full year. Recommended continuing off biologic therapy at this point.   - It would be appropriate for her to transition to adult rheumatology and dermatology as discussed with her rheumatologist. Will be following with Dr. Allen Reagan with Rheumatology. Provided recommendations for adult dermatologists (Dr. Derick Thorne or Dr. Filomena Pradhan) for further dermatologic care.   - Discussed diligent sun protection, particularly on the neck where discoloration is noticeable to her.   - Reviewed that she is likely not at increased risk for COVID-19 as compared to the general population given that she is currently off immunosuppressive medications.     Follow-up:   Follow-up with dermatology in approximately 3-6 months with adult dermatology as she transitions care. Earlier for new or changing lesions or rash.      Staff and student:    I, Carley Bains, MS4, served as a scribe during this encounter for Dr. Remigio Phelps MD.     I was present with the medical student who participated in the service and in the documentation of the note.  I have verified the history and personally performed the physical exam/image review and medical decision making.  I agree with the assessment and plan of care as documented in the note.   Remigio Phelps MD    ____________________________________________________________________    Dermatology Problem List:  1. Morphea with en coup de sabre, stable  -s/p long-term therapy with methotrexate and CellCept      2. Prominent facial and neck veins    Encounter Date: Jul 21, 2020    CC:   Chief Complaint    Patient presents with     Teledermatology     Teledermatology with photo review.        History of Present Illness:  I have reviewed the teledermatology information and the nursing intake corresponding to this issue. Brionna Griffin is a 24 year old female who presents via teledermatology for follow-up of linear morphea. She was last seen by dermatology 12/28/18, at which time she was recommended to continue MMF and methotrexate per rheumatology, and a trial off her medication was discussed.     Today, Mckenna notes that she has been off methotrexate and MMF since August 2019. She has experienced no significant flares or progression of morphea while off these medications. She believes her skin has been relatively stable overall - she does note some darker pigmentation of the lateral neck in the summertime with increased sun exposure. She tries to be diligent with her sun protection, but will forget to apply sunscreen occasionally. The progression of her forehead atrophy has possibly decreased slightly, and her hair has gotten thicker. She is not currently doing anything for treatment.     Brionna is wondering whether she is at increased risk for acquiring COVID-19 given her history. She has no other questions or skin concerns today.    ROS: Patient is generally feeling well today.     Physical Examination:  General: Well-appearing female, appropriately-developed individual. Mood pleasant, answers questions appropriately, interactive.   Skin: Focused examination of the face and neck within the teledermatology photographs was performed.   - Light purple to hyperpigmented plaque on the mid-right lateral neck. Appears improved compared to photographs from 12/28/18.  - Linear atrophic plaque of the medial forehead, extending from the anterior medial scalp to the mid-glabella. Appears stable/somewhat improved compared to photographs from 12/28/18.               Comparison to photographs from 12/28/18:              Labs:  No new.     Past Medical History:   Patient Active Problem List   Diagnosis     Long-term use of immunosuppressant medication     Attention deficit hyperactivity disorder (ADHD), predominantly hyperactive type     Scleroderma, localized     Past Medical History:   Diagnosis Date     Attention deficit hyperactivity disorder (ADHD), predominantly hyperactive type 11/8/2005     Long-term use of immunosuppressant medication 6/26/2017     Morphea (localized cutaneous scleroderma) 3/4/2015     No past surgical history on file.    Social History:  Brionna attends college at the [employer]. Plans to return in-person in the Fall.      Family History:  No family history on file.    Medications:  Current Outpatient Medications   Medication     amphetamine-dextroamphetamine (ADDERALL XR) 30 MG per capsule     Ascorbic Acid (VITAMIN C PO)     No current facility-administered medications for this visit.       No Known Allergies    ___________________________________________________________________________    Teledermatology information:  - Location of patient: Home  - Patient presented as: return  - Location of teledermatologist: PEDS DERMATOLOGY  - Reason teledermatology is appropriate: National Emergency Regarding Coronavirus disease (COVID 19) Outbreak  - Method of transmission:  Store and Forward and Telephone Visit  - Image quality and interpretability: acceptable  - Physician has received verbal consent for a Video/Photos Visit from the patient? Yes  - In-person dermatology visit recommendation: no  - Date of images: 7/21/2020  - Service start time: 10:08am  - Service end time: 10:19am  - Date of report: 07/21/20

## 2020-09-04 ENCOUNTER — TELEPHONE (OUTPATIENT)
Dept: DERMATOLOGY | Facility: CLINIC | Age: 25
End: 2020-09-04

## 2020-11-14 ENCOUNTER — HEALTH MAINTENANCE LETTER (OUTPATIENT)
Age: 25
End: 2020-11-14

## 2020-11-25 ENCOUNTER — TELEPHONE (OUTPATIENT)
Dept: DERMATOLOGY | Facility: CLINIC | Age: 25
End: 2020-11-25

## 2020-11-25 NOTE — TELEPHONE ENCOUNTER
RN spoke with patient and relayed information that she is not at increased risk for adverse effects with COVID19.  Patient denies further needs at this time.

## 2020-11-25 NOTE — TELEPHONE ENCOUNTER
RN received VM from patient on triage nurse line stating that she was recently diagnosed with COVID and is inquiring if she is at more risk or if there is anything that she would need to be aware of or more cautious of. She requests a return phone call to [phone].    Per last visit note:  - Reviewed that she is likely not at increased risk for COVID-19 as compared to the general population given that she is currently off immunosuppressive medications.     Will route to Dr. Phelps for advisement about any special considerations.

## 2020-11-25 NOTE — TELEPHONE ENCOUNTER
Thanks  Nora  I agree, I do not think that Brionna is at increased risks for adverse effects with covid 19. I hope she gets well quickly.  GARY

## 2021-05-27 ENCOUNTER — RECORDS - HEALTHEAST (OUTPATIENT)
Dept: ADMINISTRATIVE | Facility: CLINIC | Age: 26
End: 2021-05-27

## 2021-05-29 ENCOUNTER — RECORDS - HEALTHEAST (OUTPATIENT)
Dept: ADMINISTRATIVE | Facility: CLINIC | Age: 26
End: 2021-05-29

## 2021-05-31 ENCOUNTER — RECORDS - HEALTHEAST (OUTPATIENT)
Dept: ADMINISTRATIVE | Facility: CLINIC | Age: 26
End: 2021-05-31

## 2021-05-31 VITALS — BODY MASS INDEX: 25.11 KG/M2 | WEIGHT: 141.7 LBS | HEIGHT: 63 IN

## 2021-05-31 VITALS — HEIGHT: 63 IN | BODY MASS INDEX: 25.07 KG/M2 | WEIGHT: 141.5 LBS

## 2021-05-31 VITALS — WEIGHT: 138 LBS | BODY MASS INDEX: 24.54 KG/M2

## 2021-05-31 VITALS — BODY MASS INDEX: 24.37 KG/M2 | WEIGHT: 137 LBS

## 2021-06-02 VITALS — HEIGHT: 63 IN | BODY MASS INDEX: 25.87 KG/M2 | WEIGHT: 146 LBS

## 2021-06-12 NOTE — PROGRESS NOTES
Subjective:      Patient ID: Brionna Griffin is a 21 y.o. female.    Chief Complaint:    HPI Brionna Griffin is a 21 y.o. female who presents today complaining of sore throat ×3 days.  She does not have any fevers. She is a nanny. She has taken some Tylenol an cold medicine for her symptoms. She has not really noticed coughing, ear complaints, or abdominal complaints.       Past Medical History:   Diagnosis Date     ADHD (attention deficit hyperactivity disorder)     FOllowed by Dr. Conde     Obstructive sleep apnea 07/01/2011     Scleroderma     Followed by Dr. Shaw ([facility]) and derm     Scoliosis (and kyphoscoliosis), idiopathic     Followed by [place] [facility]; no bracing       Past Surgical History:   Procedure Laterality Date     CRANIOPLASTY  11/27/2011     RI REMOVE TONSILS/ADENOIDS,<13 Y/O      Description: Tonsillectomy With Adenoidectomy;  Recorded: 07/01/2011;  Comments: for JOSEPHINE         Social History   Substance Use Topics     Smoking status: Never Smoker     Smokeless tobacco: None     Alcohol use None       Review of Systems   Constitutional: Negative for fever.   HENT: Positive for sore throat. Negative for congestion, ear pain and rhinorrhea.    Respiratory: Negative for cough, shortness of breath and wheezing.    Gastrointestinal: Negative.        Objective:     /62 (Patient Site: Right Arm, Patient Position: Sitting, Cuff Size: Adult Regular)  Pulse 68  Temp 98.5  F (36.9  C) (Oral)   Resp 14  Wt 138 lb (62.6 kg)  LMP 08/02/2017 (Exact Date)  SpO2 98%  Breastfeeding? No  BMI 24.45 kg/m2    Physical Exam   Constitutional: She appears well-developed and well-nourished. No distress.   HENT:   Head: Normocephalic and atraumatic.   Right Ear: External ear normal. Tympanic membrane is scarred. Tympanic membrane is not perforated and not erythematous.   Left Ear: External ear normal. Tympanic membrane is scarred. Tympanic membrane is not perforated and not erythematous.   Eyes:  Conjunctivae are normal.   Neck: Normal range of motion. Neck supple.   Cardiovascular: Normal rate, regular rhythm and normal heart sounds.  Exam reveals no gallop and no friction rub.    No murmur heard.  Pulmonary/Chest: Effort normal and breath sounds normal. No respiratory distress. She has no wheezes. She has no rales.   Lymphadenopathy:     She has cervical adenopathy.   Skin: She is not diaphoretic.   Psychiatric: She has a normal mood and affect. Her behavior is normal. Judgment and thought content normal.   Nursing note and vitals reviewed.    Labs:  Recent Results (from the past 24 hour(s))   Rapid Strep A Screen-Throat   Result Value Ref Range    Rapid Strep A Antigen Group A Strep detected (!) No Group A Strep detected, presumptive negative         Procedures    1. Throat pain  Rapid Strep A Screen-Throat   2. Strep pharyngitis  cephalexin (KEFLEX) 500 MG capsule         Patient Instructions   1) Increase rest and fluid intake.  2) Take Tylenol or Ibuprofen as needed for fever and pain relief.  3) Strep infection is considered contagious until treated for 24 hours, avoid attending school or work during contagious period.  4) Complete full course of antibiotics.   5) Replace toothbrush after being on the antibiotic for 24 hours to avoid reinfecting yourself.   6) Return if not resolved in one week or sooner if worsening.

## 2021-06-13 NOTE — PROGRESS NOTES
[facility] Well Child Check    ASSESSMENT & PLAN  Brionna Griffin is a 21 y.o. who has normal growth and normal development.    Diagnoses and all orders for this visit:    Encounter for routine child health examination without abnormal findings    Routine screening for STI (sexually transmitted infection)  -     Chlamydia trachomatis & Neisseria gonorrhoeae, Amplified Detection    Contraception management  -     Ambulatory referral to Midwifery    Other orders  -     Influenza, Seasonal,Quad Inj, 36+ MOS    Brionna is interested in stopping OCP and getting Nexplanon implant for contraception.  Recommend referral to midwives for insertion.  Continues to be followed by Dr. Conde for ADHD medication management  Continues to be followed by [facility] dermatology and rheumatology for scleroderma/ morphea    Return to clinic in 1 year for a Well Child Check or sooner as needed    IMMUNIZATIONS/LABS  Immunizations were reviewed and orders were placed as appropriate.    REFERRALS  Dental:  Recommend routine dental care as appropriate., The patient has already established care with a dentist.  Other:  Referrals were made for midwifery.    ANTICIPATORY GUIDANCE  I have reviewed age appropriate anticipatory guidance.  Social:  Friends, Extracurricular Activities and Changes and Choices  Parenting:  [place]/Dependence, Homework and Confidential Health Care  Nutrition:  Body Image  Play and Communication:  Hobbies  Health:  Activity (>45 min/day), Sleep and Dental Care  Safety:  Seat Belts  Sexuality:  Safe Sex, STD's and Contraception    HEALTH HISTORY  Do you have any concerns that you'd like to discuss today?: Birth Control    She is currently on oral contraception and would like to switch to a different method. She has been thinking about the Nexplanon implant as many of her friends have it and find it helpful. Some of her friends have stopped getting their monthly menstrual period. She has a sufficient supply of oral  contraception pills to last until she can receive the implant. She has monthly periods with normal menorrhea and mild cramping.    Roomed by: Maria Fernanda    Refills needed? Yes    Do you have any forms that need to be filled out? No      Do you have any significant health concerns in your family history?: No History reviewed. No pertinent family history.     Since your last visit, have there been any major changes in your family, such as a move, job change, separation, divorce, or death in the family?: No    Home  Who lives in your home?:  See narrative below.  Social History     Social History Narrative    Lives with mom, dad, and sister     Do you have any trouble with sleep?:  No, she falls asleep easily in the evening. She sleeps soundly through the night without waking. She gets sufficient restful sleep each night. She does not have significant difficulty waking up in the morning.    Education  What school does your child attend?:  [employer]  What grade is your child in?:  College  How does the patient perform in school (grades, behavior, attention, homework?: Well. She recalls not doing well last year in college because she took multiple difficult classes. This year, she switched her major to education and has found her niche. She earns good grades and finds her workload manageable. She is pursuing integrated and special education with an ASL sign language. She is retaking biology this year and is doing much better this year. She takes 30 mg of Adderall daily along with 20 mg of short-acting Ritalin daily. She sees Dr. Conde for her ADHD medication management. She finds the medication management helpful.    Eating She has a good appetite. She does not skip meals throughout the day. She eats a healthy, balanced diet with a variety of fruits, vegetables, and proteins. She does not eat much red meat. She does not drink cow's milk. She drinks almond milk and water daily. She does not currently take a  "daily multivitamin, Vitamin D, or iron supplement.  Does patient eat regular meals including fruits and vegetables?:  yes  What is the patient drinking (cow's milk, water, soda, juice, sports drinks, energy drinks, etc)?: water and almond milk.  Does patient have concerns about body or appearance?:  No    Activities  Does the patient have friends?:  Yes, she has good friends with whom she rents a house in [place].  Does the patient get at least one hour of physical activity per day?:  yes  Does the patient have less than 2 hours of screen time per day (aside from homework)?:  yes  What does your child do for exercise?:  Walking  Does the patient have interest/participate in community activities/volunteers/school sports?:  yes    MENTAL HEALTH SCREENING  PHQ-2 Total Score: 0 (10/13/2017 11:00 AM)        TB Risk Assessment:  The patient and/or parent/guardian answer positive to:  patient and/or parent/guardian answer 'no' to all screening TB questions    Dental  Is your child being seen by a dentist?  Yes    Patient Active Problem List   Diagnosis     Scleroderma     Scoliosis     Ventricular Septal Defect     ADHD, Combined Type     Back pain, lumbosacral     Drugs  Does the patient use tobacco/alcohol/drugs?:  yes, states drinks 1-2 glasses of wine per week.    Safety  Does the patient have any safety concerns (peer or home)?:  no  Does the patient use safety belts, helmets and other safety equipment?:  yes    Sex  Is the patient sexually active?:  Yes. 5 lifetime male partners. Negative history of STI    REVIEW OF SYSTEMS  History obtained from patient.  General: Negative  Morphea: She is seen by a pediatric dermatologist for management of her morphea. She will be following up with them in a couple months.  Dental: She brushes her teeth twice daily. She does not have dental caries.  Her parents have no other health or developmental concerns.    MEASUREMENTS  Height:  5' 3\" (1.6 m)  Weight: 141 lb 11.2 oz (64.3 " kg)  BMI: Body mass index is 25.1 kg/(m^2).  Blood Pressure: 100/64  Blood pressure percentiles are 23 % systolic and 56 % diastolic based on NHBPEP's 4th Report. Blood pressure percentile targets: 90: 121/76, 95: 125/80, 99 + 5 mmH/93.    PHYSICAL EXAM  Constitutional: She appears well-developed and well-nourished. She is awake, alert, and cooperative.  HEENT: Head: Normocephalic. Atraumatic.   Right Ear: Normal, pearly tympanic membrane; external ear and canal normal.    Left Ear: Normal, pearly tympanic membrane; external ear and canal normal.    Nose: Nose normal.    Mouth/Throat: Mucous membranes are moist. Oropharynx is clear. Tonsils +1 bilaterally. Normal dentition.   Eyes: Conjunctivae and lids are normal. PERRL, EOMI.   Neck: Supple without lymphadenopathy or tenderness. No thyromegaly or nodules.  Cardiovascular: Normal rate and regular rhythm. No murmur heard. Femoral pulses 2+ bilaterally.  Pulmonary: Clear to auscultation bilaterally. Effort and breath sounds normal. There is normal air entry.   Chest: Normal chest wall. Breast development is normal. SMR 5.   Abdominal: Soft, nontender, nondistended. Bowel sounds are normal. No hepatosplenomegaly.  Musculoskeletal: Moving all extremities with normal range of motion. Normal strength and tone. No abnormalities. No pain in the extremities.  Spine: Spine straight without curvature noted  Genitourinary: Normal external female genitalia. SMR 5.   Neurological: She is alert and oriented x3. She has normal reflexes. Normal tone and DTRs +2 bilaterally.  Psychiatric: She has a normal mood and affect. Her speech and behavior are normal.  Skin: Clear. No rashes or lesions noted.    ADDITIONAL HISTORY SUMMARIZED (2): Reviewed Pediatric Dermatology note from 17 regarding morphea follow-up.  DECISION TO OBTAIN EXTRA INFORMATION (1): None.   RADIOLOGY TESTS (1): None.  LABS (1): Ordered labs.  MEDICINE TESTS (1): None.  INDEPENDENT REVIEW (2 each): None.      The visit lasted a total of 28 minutes face to face with the patient. Over 50% of the time was spent counseling and educating the patient about her overall health and development.    I, Adebayo Wise, am scribing for and in the presence of, Dr. Moreno.    I, Aleah Moreno MD, personally performed the services described in this documentation, as scribed by Adebayo Wise in my presence, and it is both accurate and complete.    Total Data Points: 3

## 2021-06-13 NOTE — PROGRESS NOTES
SUBJECTIVE:    HPI:    21 y.o. female presenting today for Nexplanon consultation and insertion. She is currently sexually active with men only at this time but is not currently partnered. She is in her 2nd year of college at [employer] and hopes to become a teacher.     She is right hand dominant.  Current contraception: OCPs  Last sexual activity: 1 week ago while using OCPs and condoms.     PMH:  No known contraindications to Nexplanon      No current or past history of thrombosis or thromboembolic disorders       No hepatic tumors or active liver disease       No undiagnosed abnormal genital bleeding       No known or suspected carcinoma of the breast or personal history of breast cancer       No hypersensitivity to any of the components of NEXPLANON      No known history of keloids  NEXPLANON has been discussed with healthcare provider who answered all   questions. It was explained that there are benefits as well as risks with using NEXPLANON. The patient understands that there are other birth control methods and that each has its own benefits and risks.   She also understands that she needs to sign a consent form to show that she is making an   informed and careful decision to use NEXPLANON, and that she has read and understood the following points.     NEXPLANON helps to keep her from getting pregnant.     No contraceptive method is 100% effective, including NEXPLANON.     NEXPLANON has an implant that contains a hormone.     It is important to have the NEXPLANON implant placed in the arm at the right time ofthe menstrual cycle.     After the implant is placed in my arm, the patient should check that it is in place by gently pressing her fingertips over the skin where the implant was placed. She should be able to feel the implant.     The implant must be removed at the end of three years. The implant can be removed sooner if she wants it removed.     If she has trouble finding a healthcare provider to remove the  implant, she can call [phone] for help.     The implant is placed under the skin of the arm during a procedure done in a healthcare provider s office. There is a slight risk of getting a scar or an infection from this procedure.     Removal is usually a minor procedure. Sometimes, removal may be more difficult. Special procedures, including surgery in the hospital, may be needed. Difficult removals may cause pain and scarring and may result in injury to nerves and blood vessels. If the implant is not removed, its effects may continue.     Most women have changes in their menstrual bleeding patterns while using NEXPLANON. Bleeding may be irregular, lighter or heavier, or bleeding may completely stop. If the patient thinks she is pregnant, she should contact my healthcare provider as soon as possible.     the patient verbalizes her understanding of the warning signs for problems with NEXPLANON. She has been advised to seek medical attention if any warning signs appear.     She should tell all her healthcare providers that she is using NEXPLANON.     The patient was advised to have a medical checkup regularly and at any time she is having problems.     NEXPLANON does not provide protection from HIV infection (AIDS) or any other sexually transmitted diseases.   After learning about NEXPLANON, the patient chose to use NEXPLANON.     ROS: 10 point review of symptoms otherwise negative except as detailed in HPI.     OBJECTIVE:  Physical Exam   Constitutional: She appears well-developed and well-nourished. No distress.   Cardiovascular: Normal rate and normal heart sounds.   No murmur heard.   Pulmonary/Chest: Effort normal and breath sounds normal.   Neurological: She is alert.   Skin: She is not diaphoretic.   Psychiatric: She has a normal mood and affect.     LABS: UPT NEG (contraception change)      ASSESSMENT:   Appropriate candidate for Nexplanon    PLAN & PROCEDURE NOTE:  Time spent discussing BCM options and  risks and benefits; questions answered and Brionna elects for Nexplanon  She elected to proceed with the placement after the risks and benefits were discussed. Denies allergy to local anesthesia, keloid formation.     Consent signed and will be scanned in EMR.     After cleansing left (non-dominant) arm above the elbow, 2 mL of 1% Lidocaine was administered along the planned injection site for Nexplanon. Insertion site cleansed with iodine. Nexplanon was then inserted 8cm above the medial epicondyle of the humerus, in the groove between the biceps and the triceps (sulcus bicipitalis medialis). Palpation revealed subdermal placement; the patient was able to feel implant as well.     Bleeding was minimal and a pressure dressing was applied with instructions to leave this in place for 24 hours. Pt was instructed to observe for signs/symptoms of any infection (localized tenderness, pain, inflammation) or excessive bruising.  No apparent distress at completion of procedure but did take time for recovery with benefit from water and small snack. No complications.     Nexplanon LOT #: [id number]  Exp 01/2020  NDC# [id number]    Total time with patient 20 minutes for consultation and discussion of BCM and 20 minutes for consent and procedure completion  minutes with >50% on education, counseling and coordination of care.    Rekha Rosa, TERRY, APRN, CNM

## 2021-06-23 NOTE — PROGRESS NOTES
[facility] Pediatric Acute Visit     HPI:  Brionna Griffin is a 23 y.o.  female who presents to the clinic with a history of cold symptoms for over 2 weeks.  She is feeling like her nasal congestion is worse now than when she first became ill.  She woke up this morning with yellow discharge in both eyes but is not had any redness of her sclera and no further discharge.  She does feel pressure below both eyes.  She also is complaining of a sore throat.  She has been afebrile.  She has had a history of sinus infections and is here today wondering if she is developed another sinusitis.        Past Med / Surg History:  Past Medical History:   Diagnosis Date     ADHD (attention deficit hyperactivity disorder)     FOllowed by Dr. Conde     Obstructive sleep apnea 07/01/2011     Scleroderma (H)     Followed by Dr. Shaw ([place]) and derm     Scoliosis (and kyphoscoliosis), idiopathic     Followed by [facility]; no bracing     Past Surgical History:   Procedure Laterality Date     CRANIOPLASTY  11/27/2011     [place] REMOVE TONSILS/ADENOIDS,<11 Y/O      Description: Tonsillectomy With Adenoidectomy;  Recorded: 07/01/2011;  Comments: for JOSEPHINE     WISDOM TOOTH EXTRACTION         Fam / Soc History:  Family History   Problem Relation Age of Onset     No Medical Problems Mother      Diabetes Father      Hyperlipidemia Father      Hyperlipidemia Brother      Breast cancer Maternal Aunt         55, post-menopausal     Skin cancer Maternal Uncle      No Medical Problems Sister      Social History     Social History Narrative    Lives with mom, dad, and sister         ROS:  Gen: No fever or fatigue  Eyes: No eye discharge.   ENT: No nasal congestion or rhinorrhea. No pharyngitis. No otalgia.  Resp: No SOB, cough or wheezing.  GI:No diarrhea, nausea or vomiting  :No dysuria  MS: No joint/bone/muscle tenderness.  Skin: No rashes  Neuro: No headaches  Lymph/Hematologic: No gland swelling      Objective:  Vitals: Pulse 100   Temp 98.8  " F (37.1  C) (Oral)   Ht 5' 3.25\" (1.607 m)   Wt 146 lb (66.2 kg)   LMP 12/14/2018   SpO2 98%   BMI 25.66 kg/m      Gen: Alert, well appearing  ENT: Erythematous boggy inferior turbinates bilaterally with rhinorrhea. Oropharynx normal, moist mucosa.  TMs normal bilaterally.  Eyes: Conjunctivae clear bilaterally.   Heart: Regular rate and rhythm; normal S1 and S2; no murmurs, gallops, or rubs.  Lungs: Unlabored respirations; clear breath sounds.  Musculoskeletal: Joints with full range-of-motion. Normal upper and lower extremities.  Skin: Normal without lesions.  Neuro: Oriented. Normal reflexes; normal tone; no focal deficits appreciated. Appropriate for age.  Hematologic/Lymph/Immune: No cervical lymphadenopathy  Psychiatric: Appropriate affect      Pertinent results / imaging:  Reviewed     Assessment and Plan:    Brionna Griffin is a 23 y.o. female with:    1. Sinusitis, unspecified chronicity, unspecified location    We will start amoxicillin 875 mg p.o. twice daily for the next 10 days.  If there is no improvement or worsening symptoms she should be seen back for reevaluation.  She agrees with that plan.  I have encouraged her to establish with Dr. Bradford now that she is 23.          Vita GARCIA  1/10/2019    "

## 2021-06-25 NOTE — PROGRESS NOTES
Progress Notes by Bay Pretty DO at 8/5/2017  8:00 AM     Author: Bay Pretty DO Service: -- Author Type: Physician    Filed: 8/5/2017  8:43 PM Encounter Date: 8/5/2017 Status: Signed    : Bay Pretty DO (Physician)       Chief Complaint   Patient presents with   ? poss ear infection     Left ear, admit pus in ear. 2x days.    ? poss sore throat     2x days. deny fever     History of Present Illness: Nursing notes reviewed. Patient had an underwater sensation in left ear two days ago, and she had a yellowish drainage on her pillow last night. She has prior history of middle ear infections. She has a sore throat at exam.     Review of systems: See history of present illness, otherwise negative.     Current Outpatient Prescriptions   Medication Sig Dispense Refill   ? APRI 0.15-0.03 mg per tablet TAKE 1 TABLET BY MOUTH DAILY. 84 tablet 3   ? desogestrel-ethinyl estradiol (APRI) 0.15-0.03 mg per tablet Take 1 tablet by mouth daily. 3 Package 4   ? dextroamphetamine-amphetamine (ADDERALL XR) 30 MG 24 hr capsule Take 30 mg by mouth every morning.     ? folic acid (FOLVITE) 1 MG tablet Take 1 mg by mouth daily.     ? methotrexate sodium (METHOTREXATE) 2.5 mg DsPk Take 5 mg by mouth every 7 days.     ? methylphenidate (RITALIN) 20 MG tablet Take 20 mg by mouth 2 (two) times a day.     ? mycophenolate (CELLCEPT) 500 mg tablet Take 1,000 mg by mouth 2 (two) times a day.     ? amoxicillin (AMOXIL) 500 MG capsule Take 2 capsules (1,000 mg total) by mouth 2 (two) times a day for 10 days. 40 capsule 0   ? FERROUS SULFATE, DRIED (IRON, DRIED, ORAL) Take by mouth.       No current facility-administered medications for this visit.        Past Medical History:   Diagnosis Date   ? ADHD (attention deficit hyperactivity disorder)     FOllowed by Dr. Conde   ? Obstructive sleep apnea 07/01/2011   ? Scleroderma     Followed [facility] Dr. Shaw ([facility]) and derm   ? Scoliosis (and kyphoscoliosis), idiopathic     Followed by  [facility]; no bracing      Past Surgical History:   Procedure Laterality Date   ? CRANIOPLASTY  11/27/2011   ? [place] REMOVE TONSILS/ADENOIDS,<11 Y/O      Description: Tonsillectomy With Adenoidectomy;  Recorded: 07/01/2011;  Comments: for JOSEPHINE      Social History     Social History   ? Marital status: Single     Spouse name: N/A   ? Number of children: N/A   ? Years of education: N/A     Social History Main Topics   ? Smoking status: Never Smoker   ? Smokeless tobacco: None   ? Alcohol use None   ? Drug use: None   ? Sexual activity: Not Asked     Other Topics Concern   ? None     Social History Narrative    Lives with mom, dad, and sister       History   Smoking Status   ? Never Smoker   Smokeless Tobacco   ? Not on file      Exam:   Blood pressure 104/68, pulse 92, temperature 98.5  F (36.9  C), temperature source Oral, resp. rate 18, weight 137 lb (62.1 kg), last menstrual period 08/02/2017, SpO2 97 %, not currently breastfeeding.    EXAM:   General: Vital signs reviewed. Patient is in no acute appearing distress. Breathing is non labored appearing. Patient is alert and oriented x 3.   ENT: Ear exam shows left TM to be occluded by copious clear fluid drainage, which has a frothy appearance, right TM is mildly injected and edematous, right nasal turbinates are injected and edematous, mild pharyngeal injection with no exudate noted.  Neck: supple with right sided adenopathy.  Heart: Normal rate and rhythm without murmur  Lungs: Clear to auscultation with good air flow bilaterally.  Skin: warm and dry    Assessment/Plan   1. Sinusitis  amoxicillin (AMOXIL) 500 MG capsule   2. Left otitis media with effusion  amoxicillin (AMOXIL) 500 MG capsule       Patient Instructions     I think a follow up exam is a good idea in about 2 weeks. Be seen sooner if symptoms worsen.   Acute Bacterial Rhinosinusitis (ABRS)    Acute bacterial rhinosinusitis (ABRS) is an infection of your nasal cavity and sinuses. Its caused by  bacteria. Acute means that youve had symptoms for less than 12 weeks.  Understanding your sinuses  The nasal cavity is the large air-filled space behind your nose. The sinuses are a group of spaces formed by the bones of your face. They connect with your nasal cavity. ABRS causes the tissue lining these spaces to become inflamed. Mucus may not drain normally. This leads to facial pain and other symptoms.  What causes ABRS?  ABRS most often follows an upper respiratory infection caused by a virus. Bacteria then infect the lining of your nasal cavity and sinuses. But you can also get ABRS if you have:    Nasal allergies    Long-term nasal swelling and congestion not caused by allergies    Blockage in the nose  Symptoms of ABRS  The symptoms of ABRS may be different for each person, and can include:    Nasal congestion    Runny nose    Fluid draining from the nose down the throat (postnasal drip)    Headache    Cough    Pain in the sinuses    Thick, colored fluid from the nose (mucus)    Fever  Diagnosing ABRS  ABRS may be diagnosed if youve had an upper respiratory infection like a cold and cough for longer than 10 to 14 days. Your health care provider will ask about your symptoms and your medical history. The provider will check your vital signs, including your temperature. Youll have a physical exam. The health care provider will check your ears, nose, and throat. You likely wont need any tests. If ABRS comes back, you may have a culture or other tests.  Treatment for ABRS  Treatment may include:    Antibiotic medicine. This is for symptoms that last for at least 10 to 14 days.    Nasal corticosteroid medicine. Drops or spray used in the nose can lessen swelling and congestion.    Over-the-counter pain medicine. This is to lessen sinus pain and pressure.    Nasal decongestant medicine. Spray or drops may help to lessen congestion. Do not use them for more than a few days.    Salt wash (saline irrigation). This can  help to loosen mucus.  Possible complications of ABRS  ABRS may come back or become long-term (chronic).  In rare cases, ABRS may cause complications such as:     Inflamed tissue around the brain and spinal cord (meningitis)    Inflamed tissue around the eyes (orbital cellulitis)    Inflamed bones around the sinuses (osteitis)  These problems may need to be treated in a hospital with intravenous (IV) antibiotic medicine or surgery.  When to call the health care provider  Call your health care provider if you have any of the following:    Symptoms that dont get better, or get worse    Symptoms that dont get better after 3 to 5 days on antibiotics    Trouble seeing    Swelling around your eyes    Confusion or trouble staying awake   Date Last Reviewed: 3/3/2015    4164-1381 [employer]. [address] [place] [address], [place] [address]. All rights reserved. This information is not intended as a substitute for professional medical care. Always follow your healthcare professional's instructions.       Bay Pretty,

## 2021-09-12 ENCOUNTER — HEALTH MAINTENANCE LETTER (OUTPATIENT)
Age: 26
End: 2021-09-12

## 2022-01-02 ENCOUNTER — HEALTH MAINTENANCE LETTER (OUTPATIENT)
Age: 27
End: 2022-01-02

## 2022-07-21 ENCOUNTER — OFFICE VISIT (OUTPATIENT)
Dept: FAMILY MEDICINE | Facility: CLINIC | Age: 27
End: 2022-07-21
Payer: COMMERCIAL

## 2022-07-21 VITALS
WEIGHT: 156 LBS | DIASTOLIC BLOOD PRESSURE: 73 MMHG | TEMPERATURE: 98.9 F | HEART RATE: 73 BPM | BODY MASS INDEX: 27.3 KG/M2 | SYSTOLIC BLOOD PRESSURE: 107 MMHG | RESPIRATION RATE: 15 BRPM | OXYGEN SATURATION: 99 %

## 2022-07-21 DIAGNOSIS — R07.0 THROAT PAIN: ICD-10-CM

## 2022-07-21 DIAGNOSIS — J02.9 VIRAL PHARYNGITIS: Primary | ICD-10-CM

## 2022-07-21 LAB — DEPRECATED S PYO AG THROAT QL EIA: NEGATIVE

## 2022-07-21 PROCEDURE — 99203 OFFICE O/P NEW LOW 30 MIN: CPT | Performed by: FAMILY MEDICINE

## 2022-07-21 PROCEDURE — 87651 STREP A DNA AMP PROBE: CPT | Performed by: FAMILY MEDICINE

## 2022-07-22 LAB — GROUP A STREP BY PCR: NOT DETECTED

## 2022-07-22 NOTE — PROGRESS NOTES
Assessment:       Viral pharyngitis    Throat pain  - Streptococcus A Rapid Screen w/Reflex to PCR - Clinic Collect  - Group A Streptococcus PCR Throat Swab         Plan:     Symptoms consistent with a viral upper respiratory infection/viral pharyngitis.  Rapid strep screen is negative.  Discussed the typical course of symptoms.  No antibiotics indicated at this time.  Recommend symptomatic treatment such as decongestants and acetominephen or ibuprofen as needed.  Recommend follow up if getting worse or not improving.      MEDICATIONS:   Orders Placed This Encounter   Medications     DM-Phenylephrine-Acetaminophen (VICKS DAYQUIL COLD & FLU PO)         Subjective:       26 year old female presents for evaluation of a 1 day history of sore throat and runny nose.  She denies fevers, chills, headache, cough, shortness of breath, wheezing, or ear pain.  She did go home COVID-19 test which was negative.  She was make sure she does not have strep.    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       No  Known Allergies    Family History   Problem Relation Age of Onset     No Known Problems Mother      Diabetes Father      Hyperlipidemia Father      Hyperlipidemia Brother      Breast Cancer Maternal Aunt         55, post-menopausal     Skin Cancer Maternal Uncle      No Known Problems Sister        Social History     Socioeconomic History     Marital status: Single   Tobacco Use     Smoking status: Never Smoker     Smokeless tobacco: Never Used         Review of Systems  Pertinent items are noted in HPI.      Objective:                   General Appearance:    /73   Pulse 73   Temp 98.9  F (37.2  C)   Resp 15   Wt 70.8 kg (156 lb)   LMP 07/11/2022   SpO2 99%   Breastfeeding No   BMI 27.30 kg/m          Alert, pleasant, cooperative, no distress, appears stated age   Head:    Normocephalic, without obvious abnormality, atraumatic   Eyes:    Conjunctiva/corneas clear   Ears:    Normal TM's without erythema or bulging. Normal external ear canals, both ears   Nose:   Nares normal, septum midline, mucosa normal, no drainage    or sinus tenderness   Throat:   Lips, mucosa, and tongue normal; teeth and gums normal.  No tonsilar hypertrophy or exudate.   Neck:   Supple, symmetrical, trachea midline, no adenopathy    Lungs:     Clear to auscultation bilaterally without wheezes, rales, or rhonchi, respirations unlabored    Heart:    Regular rate and rhythm, S1 and S2 normal, no murmur, rub or gallop       Extremities:   Extremities normal, atraumatic, no cyanosis or edema   Skin:   Skin color, texture, turgor normal, no rashes or lesions         Results for orders placed or performed in visit on 07/21/22   Streptococcus A Rapid Screen w/Reflex to PCR - Clinic Collect     Status: Normal    Specimen: Throat; Swab   Result Value Ref Range    Group A Strep antigen Negative Negative       This note has been dictated using voice recognition software. Any grammatical or context distortions are unintentional and inherent  to the software

## 2022-08-02 ENCOUNTER — OFFICE VISIT (OUTPATIENT)
Dept: FAMILY MEDICINE | Facility: CLINIC | Age: 27
End: 2022-08-02
Payer: COMMERCIAL

## 2022-08-02 VITALS
RESPIRATION RATE: 14 BRPM | HEART RATE: 82 BPM | SYSTOLIC BLOOD PRESSURE: 107 MMHG | OXYGEN SATURATION: 97 % | DIASTOLIC BLOOD PRESSURE: 65 MMHG | TEMPERATURE: 98.2 F | BODY MASS INDEX: 27.3 KG/M2 | WEIGHT: 156 LBS

## 2022-08-02 DIAGNOSIS — H10.31 ACUTE CONJUNCTIVITIS OF RIGHT EYE, UNSPECIFIED ACUTE CONJUNCTIVITIS TYPE: ICD-10-CM

## 2022-08-02 DIAGNOSIS — H65.91 OME (OTITIS MEDIA WITH EFFUSION), RIGHT: Primary | ICD-10-CM

## 2022-08-02 PROCEDURE — 99214 OFFICE O/P EST MOD 30 MIN: CPT | Performed by: PHYSICIAN ASSISTANT

## 2022-08-02 RX ORDER — FLUTICASONE PROPIONATE 50 MCG
1 SPRAY, SUSPENSION (ML) NASAL DAILY
Qty: 9.9 ML | Refills: 0 | Status: SHIPPED | OUTPATIENT
Start: 2022-08-02 | End: 2022-08-26

## 2022-08-02 NOTE — PATIENT INSTRUCTIONS
You were seen today for an infection of the middle ear, also called otitis media.    Treatment:  - Use antibiotics as prescribed until completion, even if symptoms improve  - May give tylenol or ibuprofen for irritation and discomfort (see tables below for doses)  - Follow-up with their pediatrician in 10 days for another ear check  - Should notice symptom improvement in the next 36-48 hours    When to come back sooner for re-evaluation?  - If symptoms have not begun improving after 48 hours of taking antibiotics  - Develops a fever or current fever worsens  - Becomes short of breath  - Neck stiffness  - Difficulty swallowing   - Signs of dehydration including severe thirst, dark urine, dry skin, cracked lips

## 2022-08-02 NOTE — PROGRESS NOTES
Patient presents with:  Pharyngitis: Sore throat, cough,  stuffy nose, right ear drainage pain, right eye redness like a film on eye      Clinical Decision Making:  Patient had at home COVID test that was negative.  She is treated for bilateral otitis media with Augmentin. Expected course of resolution and indication for return was gone over and questions were answered to patient/parent's satisfaction before discharge.          ICD-10-CM    1. OME (otitis media with effusion), right  H65.91 amoxicillin-clavulanate (AUGMENTIN) 875-125 MG tablet     fluticasone (FLONASE) 50 MCG/ACT nasal spray   2. Acute conjunctivitis of right eye, unspecified acute conjunctivitis type  H10.31        Patient Instructions   You were seen today for an infection of the middle ear, also called otitis media.    Treatment:  - Use antibiotics as prescribed until completion, even if symptoms improve  - May give tylenol or ibuprofen for irritation and discomfort (see tables below for doses)  - Follow-up with their pediatrician in 10 days for another ear check  - Should notice symptom improvement in the next 36-48 hours    When to come back sooner for re-evaluation?  - If symptoms have not begun improving after 48 hours of taking antibiotics  - Develops a fever or current fever worsens  - Becomes short of breath  - Neck stiffness  - Difficulty swallowing   - Signs of dehydration including severe thirst, dark urine, dry skin, cracked lips          HPI:  Brionna Griffin is a 26 year old female who presents today for sore throat cough congestion and right red pruritic eye.  Patient states she has had symptoms over the last 2 weeks and now started developing bilateral ear pain over the last 2 days.  Right eye also became red and had some drainage and congestion.  Patient has not had difficulty with fever chills night sweats or fatigue.  She has had cough.  She did do several at home COVID test with the last one negative last Friday.    No  contact lens wear, just eyeglasses.    History obtained from chart review and the patient.    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    Vitals:    08/02/22 1206   BP: 107/65   Pulse: 82   Resp: 14   Temp: 98.2  F (36.8  C)   TempSrc: Oral   SpO2: 97%   Weight: 70.8 kg (156 lb)       General: Patient is resting comfortably no acute distress is afebrile  HEENT: Head is normocephalic atraumatic   eyes are PERRL EOMI sclera is injected on the right eye and with hyperemia  No noted pre or postauricular lymphadenopathy  TMs are erythematous bilaterally with effusion on the right.  Throat is with mild pharyngeal wall drainage  No cervical lymphadenopathy present  LUNGS: Clear to auscultation bilaterally  HEART: Regular rate and rhythm  Skin: Without rash non-diaphoretic    Physical Exam    At the end of the encounter, I discussed results, diagnosis, medications. Discussed red flags for immediate return to clinic/ER, as well as indications for follow up if no improvement. Patient understood and agreed to plan. Patient was stable for discharge.

## 2022-08-24 DIAGNOSIS — H65.91 OME (OTITIS MEDIA WITH EFFUSION), RIGHT: ICD-10-CM

## 2022-08-26 RX ORDER — FLUTICASONE PROPIONATE 50 MCG
1 SPRAY, SUSPENSION (ML) NASAL DAILY
Qty: 16 ML | Refills: 0 | Status: SHIPPED | OUTPATIENT
Start: 2022-08-26 | End: 2022-09-25

## 2022-08-26 NOTE — TELEPHONE ENCOUNTER
"Routing refill request to provider for review/approval because:  Dose limited SIG.  Please clarify for longer term dosing.  Patient needs to be seen because it has been more than 3 years since last office visit.    Last Written Prescription Date:  8/2/22  Last Fill Quantity: 9.9 ml,  # refills: 0   Last office visit provider:  1/10/2019     Requested Prescriptions   Pending Prescriptions Disp Refills     fluticasone (FLONASE) 50 MCG/ACT nasal spray [Pharmacy Med Name: FLUTICASONE PROP 50 MCG SPRAY] 16 mL      Sig: SPRAY 1 SPRAY INTO BOTH NOSTRILS DAILY FOR 30 DAYS       Nasal Allergy Protocol Passed - 8/26/2022  8:01 AM        Passed - Patient is age 12 or older        Passed - Recent (12 mo) or future (30 days) visit within the authorizing provider's specialty     Patient has had an office visit with the authorizing provider or a provider within the authorizing providers department within the previous 12 mos or has a future within next 30 days. See \"Patient Info\" tab in inbasket, or \"Choose Columns\" in Meds & Orders section of the refill encounter.              Passed - Medication is active on med list             Cameron Vazquez RN 08/26/22 8:01 AM  "

## 2022-11-19 ENCOUNTER — HEALTH MAINTENANCE LETTER (OUTPATIENT)
Age: 27
End: 2022-11-19

## 2023-04-09 ENCOUNTER — HEALTH MAINTENANCE LETTER (OUTPATIENT)
Age: 28
End: 2023-04-09

## 2023-04-27 ENCOUNTER — OFFICE VISIT (OUTPATIENT)
Dept: FAMILY MEDICINE | Facility: CLINIC | Age: 28
End: 2023-04-27
Payer: COMMERCIAL

## 2023-04-27 VITALS
BODY MASS INDEX: 27.82 KG/M2 | HEART RATE: 101 BPM | WEIGHT: 159 LBS | DIASTOLIC BLOOD PRESSURE: 76 MMHG | TEMPERATURE: 99.6 F | RESPIRATION RATE: 18 BRPM | OXYGEN SATURATION: 97 % | SYSTOLIC BLOOD PRESSURE: 113 MMHG

## 2023-04-27 DIAGNOSIS — R07.0 THROAT PAIN: Primary | ICD-10-CM

## 2023-04-27 LAB
DEPRECATED S PYO AG THROAT QL EIA: NEGATIVE
GROUP A STREP BY PCR: NOT DETECTED

## 2023-04-27 PROCEDURE — 99213 OFFICE O/P EST LOW 20 MIN: CPT | Performed by: PHYSICIAN ASSISTANT

## 2023-04-27 PROCEDURE — 87651 STREP A DNA AMP PROBE: CPT | Performed by: PHYSICIAN ASSISTANT

## 2023-04-27 NOTE — PATIENT INSTRUCTIONS
Suggested increased rest increased fluids and bedside humidification  Over-the-counter Tylenol for comfort.  Follow packaging directions  Over-the-counter throat lozenges with benzocaine such as Cepacol may be used if indicated and is not a choking hazard based on age.  Follow packaging directions.  Do not overuse the benzocaine as it will dry the throat and make it uncomfortable.  Follow up with primary care provider if you do not get resolution with the course of treatment.  Return to walk-in care if complication or new symptoms arise in the interim.            Self-Care for Sore Throats  Sore throats happen for many reasons, such as colds, allergies, and infections caused by viruses or bacteria. In any case, your throat becomes red and sore. Your goal for self-care is to reduce your discomfort while giving your throat a chance to heal.    Moisten and soothe your throat  Tips include the following:  Try a sip of water first thing after waking up.  Keep your throat moist by drinking 6 or more glasses of clear liquids every day.  Run a cool-air humidifier in your room overnight.  Avoid cigarette smoke.   Suck on throat lozenges, cough drops, hard candy, ice chips, or frozen fruit-juice bars. Use the sugar-free versions if your diet or medical condition requires them.  Gargle to ease irritation  Gargling every hour or 2 can ease irritation. Try gargling with 1 of these solutions:  1/4 teaspoon of salt in 1/2 cup of warm water  An over-the-counter anesthetic gargle  Use medicine for more relief  Over-the-counter medicine can reduce sore throat symptoms. Ask your pharmacist if you have questions about which medicine to use:  Ease pain with anesthetic sprays. Aspirin or an aspirin substitute also helps. Remember, never give aspirin to anyone 18 or younger, or if you are already taking blood thinners.   For sore throats caused by allergies, try antihistamines to block the allergic reaction.  Remember: unless a sore  throat is caused by a bacterial infection, antibiotics won t help you.  Prevent future sore throats  Prevention tips include the following:  Stop smoking or reduce contact with secondhand smoke. Smoke irritates the tender throat lining.  Limit contact with pets and with allergy-causing substances, such as pollen and mold.  When you re around someone with a sore throat or cold, wash your hands often to keep viruses or bacteria from spreading.  Don t strain your vocal cords.  Call your healthcare provider  Contact your healthcare provider if you have:  A temperature over 101 F (38.3 C)  White spots on the throat  Great difficulty swallowing  Trouble breathing  A skin rash  Recent exposure to someone else with strep bacteria  Severe hoarseness and swollen glands in the neck or jaw   Date Last Reviewed: 8/1/2016 2000-2016 [employer]. [address] [place] [address], [place] [address]. All rights reserved. This information is not intended as a substitute for professional medical care. Always follow your healthcare professional's instructions.

## 2023-04-27 NOTE — PROGRESS NOTES
Patient presents with:  Ear Problem: Pain   Throat Pain  Cough  Headache: Pressure   Nasal Congestion: Sneezing   Sinus Problem  Sick: 5 days   Covid 19 Testing: Home test was negative 2 days ago      Clinical Decision Making:  Strep test was obtained and was negative.  Culture is to follow.  COVID-19 screening test was negative at home two days ago.  Symptomatic care was gone over. Expected course of resolution and indication for return was gone over and questions were answered to patient/parent's satisfaction before discharge.        ICD-10-CM    1. Throat pain  R07.0 Streptococcus A Rapid Screen w/Reflex to PCR - Clinic Collect     Group A Streptococcus PCR Throat Swab          Patient Instructions     Suggested increased rest increased fluids and bedside humidification  Over-the-counter Tylenol for comfort.  Follow packaging directions  Over-the-counter throat lozenges with benzocaine such as Cepacol may be used if indicated and is not a choking hazard based on age.  Follow packaging directions.  Do not overuse the benzocaine as it will dry the throat and make it uncomfortable.  Follow up with primary care provider if you do not get resolution with the course of treatment.  Return to walk-in care if complication or new symptoms arise in the interim.            Self-Care for Sore Throats  Sore throats happen for many reasons, such as colds, allergies, and infections caused by viruses or bacteria. In any case, your throat becomes red and sore. Your goal for self-care is to reduce your discomfort while giving your throat a chance to heal.    Moisten and soothe your throat  Tips include the following:    Try a sip of water first thing after waking up.    Keep your throat moist by drinking 6 or more glasses of clear liquids every day.    Run a cool-air humidifier in your room overnight.    Avoid cigarette smoke.     Suck on throat lozenges, cough drops, hard candy, ice chips, or frozen fruit-juice bars. Use the  sugar-free versions if your diet or medical condition requires them.  Gargle to ease irritation  Gargling every hour or 2 can ease irritation. Try gargling with 1 of these solutions:    1/4 teaspoon of salt in 1/2 cup of warm water    An over-the-counter anesthetic gargle  Use medicine for more relief  Over-the-counter medicine can reduce sore throat symptoms. Ask your pharmacist if you have questions about which medicine to use:    Ease pain with anesthetic sprays. Aspirin or an aspirin substitute also helps. Remember, never give aspirin to anyone 18 or younger, or if you are already taking blood thinners.     For sore throats caused by allergies, try antihistamines to block the allergic reaction.    Remember: unless a sore throat is caused by a bacterial infection, antibiotics won t help you.  Prevent future sore throats  Prevention tips include the following:    Stop smoking or reduce contact with secondhand smoke. Smoke irritates the tender throat lining.    Limit contact with pets and with allergy-causing substances, such as pollen and mold.    When you re around someone with a sore throat or cold, wash your hands often to keep viruses or bacteria from spreading.    Don t strain your vocal cords.  Call your healthcare provider  Contact your healthcare provider if you have:    A temperature over 101 F (38.3 C)    White spots on the throat    Great difficulty swallowing    Trouble breathing    A skin rash    Recent exposure to someone else with strep bacteria    Severe hoarseness and swollen glands in the neck or jaw   Date Last Reviewed: 8/1/2016 2000-2016 [employer] [address] [place] [address], [place] [address]. All rights reserved. This information is not intended as a substitute for professional medical care. Always follow your healthcare professional's instructions.        HPI:  Brionna Griffin is a 27 year old female who presents today for evaluation of five days of sore throat and  odynophagia cough headache nasal congestion sneezing sinus pain and pressure over the last 5 days.  Patient denies fever, chills, night sweats, fatigue, vomiting, diarrhea, skin rash, abdominal pain or urinary symptoms.  COVID-19 testing was performed at home 2 days ago which returned as negative.    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    Has not tried treatment for this over-the-counter.    History obtained from chart review and the patient.    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    Vitals:    04/27/23 1757   BP: 113/76   Pulse: 101   Resp: 18   Temp: 99.6  F (37.6  C)   SpO2: 97%   Weight: 72.1 kg (159 lb)       General: Patient is resting comfortably no acute distress is afebrile  HEENT: Head is normocephalic atraumatic   eyes are PERRL EOMI sclera anicteric   TMs are clear bilaterally  Throat is with mild pharyngeal wall erythema and no exudate  No cervical lymphadenopathy present  LUNGS: Clear to auscultation bilaterally  HEART: Regular rate and rhythm  Skin: Without rash non-diaphoretic    Physical Exam      Labs:  Results for orders placed or performed in visit on 04/27/23   Streptococcus A Rapid Screen w/Reflex to PCR - Clinic Collect     Status: Normal    Specimen: Throat;  Swab   Result Value Ref Range    Group A Strep antigen Negative Negative        At the end of the encounter, I discussed results, diagnosis, medications. Discussed red flags for immediate return to clinic/ER, as well as indications for follow up if no improvement. Patient understood and agreed to plan. Patient was stable for discharge.

## 2024-06-15 ENCOUNTER — HEALTH MAINTENANCE LETTER (OUTPATIENT)
Age: 29
End: 2024-06-15